# Patient Record
Sex: FEMALE | Race: WHITE | NOT HISPANIC OR LATINO | Employment: FULL TIME | URBAN - METROPOLITAN AREA
[De-identification: names, ages, dates, MRNs, and addresses within clinical notes are randomized per-mention and may not be internally consistent; named-entity substitution may affect disease eponyms.]

---

## 2018-04-05 ENCOUNTER — EMPLOYEE HEALTH (OUTPATIENT)
Dept: OCCUPATIONAL MEDICINE | Facility: CLINIC | Age: 48
End: 2018-04-05

## 2018-04-05 ENCOUNTER — EH NON-PROVIDER (OUTPATIENT)
Dept: OCCUPATIONAL MEDICINE | Facility: CLINIC | Age: 48
End: 2018-04-05

## 2018-04-05 ENCOUNTER — HOSPITAL ENCOUNTER (OUTPATIENT)
Facility: MEDICAL CENTER | Age: 48
End: 2018-04-05
Attending: PREVENTIVE MEDICINE
Payer: COMMERCIAL

## 2018-04-05 VITALS
WEIGHT: 117 LBS | TEMPERATURE: 98.5 F | BODY MASS INDEX: 18.36 KG/M2 | DIASTOLIC BLOOD PRESSURE: 90 MMHG | HEIGHT: 67 IN | OXYGEN SATURATION: 98 % | SYSTOLIC BLOOD PRESSURE: 116 MMHG

## 2018-04-05 DIAGNOSIS — Z02.1 PRE-EMPLOYMENT HEALTH SCREENING EXAMINATION: ICD-10-CM

## 2018-04-05 DIAGNOSIS — Z02.1 PRE-EMPLOYMENT DRUG SCREENING: ICD-10-CM

## 2018-04-05 LAB
AMP AMPHETAMINE: NORMAL
BAR BARBITURATES: NORMAL
BZO BENZODIAZEPINES: NORMAL
COC COCAINE: NORMAL
INT CON NEG: NORMAL
INT CON POS: NORMAL
MDMA ECSTASY: NORMAL
MET METHAMPHETAMINES: NORMAL
MTD METHADONE: NORMAL
OPI OPIATES: NORMAL
OXY OXYCODONE: NORMAL
PCP PHENCYCLIDINE: NORMAL
POC URINE DRUG SCREEN OCDRS: NORMAL
THC: NORMAL

## 2018-04-05 PROCEDURE — 94375 RESPIRATORY FLOW VOLUME LOOP: CPT | Performed by: PREVENTIVE MEDICINE

## 2018-04-05 PROCEDURE — 86480 TB TEST CELL IMMUN MEASURE: CPT | Performed by: PREVENTIVE MEDICINE

## 2018-04-05 PROCEDURE — 80305 DRUG TEST PRSMV DIR OPT OBS: CPT | Performed by: PREVENTIVE MEDICINE

## 2018-04-05 PROCEDURE — 8915 PR COMPREHENSIVE PHYSICAL: Performed by: PREVENTIVE MEDICINE

## 2018-04-06 LAB
M TB TUBERC IFN-G BLD QL: NEGATIVE
M TB TUBERC IFN-G/MITOGEN IGNF BLD: -0.46
M TB TUBERC IGNF/MITOGEN IGNF CONTROL: 47.94 [IU]/ML
MITOGEN IGNF BCKGRD COR BLD-ACNC: 1.54 [IU]/ML

## 2018-04-09 ENCOUNTER — DOCUMENTATION (OUTPATIENT)
Dept: OCCUPATIONAL MEDICINE | Facility: CLINIC | Age: 48
End: 2018-04-09

## 2018-09-04 ENCOUNTER — DOCUMENTATION (OUTPATIENT)
Dept: OCCUPATIONAL MEDICINE | Facility: CLINIC | Age: 48
End: 2018-09-04

## 2018-09-06 ENCOUNTER — OFFICE VISIT (OUTPATIENT)
Dept: MEDICAL GROUP | Facility: PHYSICIAN GROUP | Age: 48
End: 2018-09-06
Payer: COMMERCIAL

## 2018-09-06 VITALS
HEART RATE: 86 BPM | HEIGHT: 67 IN | TEMPERATURE: 98.6 F | WEIGHT: 121 LBS | BODY MASS INDEX: 18.99 KG/M2 | SYSTOLIC BLOOD PRESSURE: 112 MMHG | DIASTOLIC BLOOD PRESSURE: 78 MMHG | RESPIRATION RATE: 14 BRPM | OXYGEN SATURATION: 99 %

## 2018-09-06 DIAGNOSIS — A60.00 GENITAL HERPES SIMPLEX, UNSPECIFIED SITE: ICD-10-CM

## 2018-09-06 DIAGNOSIS — N60.02 BILATERAL BREAST CYSTS: ICD-10-CM

## 2018-09-06 DIAGNOSIS — N60.01 BILATERAL BREAST CYSTS: ICD-10-CM

## 2018-09-06 DIAGNOSIS — Z00.00 WELLNESS EXAMINATION: ICD-10-CM

## 2018-09-06 DIAGNOSIS — M54.2 NECK PAIN: ICD-10-CM

## 2018-09-06 PROCEDURE — 99203 OFFICE O/P NEW LOW 30 MIN: CPT | Performed by: INTERNAL MEDICINE

## 2018-09-06 RX ORDER — BACLOFEN 10 MG/1
10 TABLET ORAL 3 TIMES DAILY PRN
Qty: 90 TAB | Refills: 1 | Status: SHIPPED | OUTPATIENT
Start: 2018-09-06 | End: 2019-02-25 | Stop reason: SDUPTHER

## 2018-09-06 RX ORDER — VALACYCLOVIR HYDROCHLORIDE 500 MG/1
500 TABLET, FILM COATED ORAL 2 TIMES DAILY
Qty: 40 TAB | Refills: 0 | Status: SHIPPED | OUTPATIENT
Start: 2018-09-06 | End: 2019-05-02 | Stop reason: SDUPTHER

## 2018-09-06 ASSESSMENT — PATIENT HEALTH QUESTIONNAIRE - PHQ9: CLINICAL INTERPRETATION OF PHQ2 SCORE: 0

## 2018-09-06 NOTE — ASSESSMENT & PLAN NOTE
History of fluid filled bilateral breast cysts that come and go. Refuses to do mammograms and has aspirations by ultrasound. Has ultrasounds as needed when she's symptomatic from cyst enlargement.
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
## 2018-09-14 ENCOUNTER — TELEPHONE (OUTPATIENT)
Dept: MEDICAL GROUP | Facility: PHYSICIAN GROUP | Age: 48
End: 2018-09-14

## 2018-09-14 NOTE — TELEPHONE ENCOUNTER
A possible sig can be; Apply (4 g) of gel to the affected area 4 times daily.    Please Advise

## 2018-09-20 ENCOUNTER — EH NON-PROVIDER (OUTPATIENT)
Dept: OCCUPATIONAL MEDICINE | Facility: CLINIC | Age: 48
End: 2018-09-20

## 2018-09-20 DIAGNOSIS — Z02.89 ENCOUNTER FOR OCCUPATIONAL HEALTH EXAMINATION INVOLVING RESPIRATOR: Primary | ICD-10-CM

## 2018-09-20 PROCEDURE — 94375 RESPIRATORY FLOW VOLUME LOOP: CPT | Performed by: PREVENTIVE MEDICINE

## 2018-09-24 ENCOUNTER — IMMUNIZATION (OUTPATIENT)
Dept: OCCUPATIONAL MEDICINE | Facility: CLINIC | Age: 48
End: 2018-09-24

## 2018-09-24 DIAGNOSIS — Z23 NEED FOR VACCINATION: ICD-10-CM

## 2018-09-24 PROCEDURE — 90686 IIV4 VACC NO PRSV 0.5 ML IM: CPT | Performed by: PREVENTIVE MEDICINE

## 2018-09-27 ENCOUNTER — HOSPITAL ENCOUNTER (OUTPATIENT)
Dept: LAB | Facility: MEDICAL CENTER | Age: 48
End: 2018-09-27
Payer: COMMERCIAL

## 2018-09-27 LAB
BDY FAT % MEASURED: 18.3 %
BP DIAS: 65 MMHG
BP SYS: 122 MMHG
CHOLEST SERPL-MCNC: 172 MG/DL (ref 100–199)
DIABETES HTDIA: NO
EVENT NAME HTEVT: NORMAL
FASTING HTFAS: YES
GLUCOSE SERPL-MCNC: 81 MG/DL (ref 65–99)
HDLC SERPL-MCNC: 58 MG/DL
HYPERTENSION HTHYP: NO
LDLC SERPL CALC-MCNC: 97 MG/DL
SCREENING LOC CITY HTCIT: NORMAL
SCREENING LOC STATE HTSTA: NORMAL
SCREENING LOCATION HTLOC: NORMAL
SMOKING HTSMO: NO
SUBSCRIBER ID HTSID: NORMAL
TRIGL SERPL-MCNC: 85 MG/DL (ref 0–149)

## 2018-09-27 PROCEDURE — S5190 WELLNESS ASSESSMENT BY NONPH: HCPCS

## 2018-09-27 PROCEDURE — 82947 ASSAY GLUCOSE BLOOD QUANT: CPT

## 2018-09-27 PROCEDURE — 80061 LIPID PANEL: CPT

## 2018-09-27 PROCEDURE — 36415 COLL VENOUS BLD VENIPUNCTURE: CPT

## 2018-11-14 ENCOUNTER — HOSPITAL ENCOUNTER (OUTPATIENT)
Dept: RADIOLOGY | Facility: MEDICAL CENTER | Age: 48
End: 2018-11-14
Attending: PHYSICIAN ASSISTANT
Payer: COMMERCIAL

## 2018-11-14 DIAGNOSIS — M54.12 BRACHIAL NEURITIS: ICD-10-CM

## 2018-11-14 PROCEDURE — 72141 MRI NECK SPINE W/O DYE: CPT

## 2019-02-25 DIAGNOSIS — M54.2 NECK PAIN: ICD-10-CM

## 2019-02-26 NOTE — TELEPHONE ENCOUNTER
Was the patient seen in the last year in this department? Yes    Does patient have an active prescription for medications requested? No     Received Request Via: Pharmacy      Pt met protocol?: Yes   Pt last ov 9/18

## 2019-02-27 RX ORDER — BACLOFEN 10 MG/1
TABLET ORAL
Qty: 90 TAB | Refills: 1 | Status: SHIPPED | OUTPATIENT
Start: 2019-02-27 | End: 2019-06-04 | Stop reason: SDUPTHER

## 2019-05-02 DIAGNOSIS — A60.00 GENITAL HERPES SIMPLEX, UNSPECIFIED SITE: ICD-10-CM

## 2019-05-02 NOTE — TELEPHONE ENCOUNTER
Was the patient seen in the last year in this department? Yes    Does patient have an active prescription for medications requested? No     Received Request Via: Patient neil

## 2019-05-03 RX ORDER — VALACYCLOVIR HYDROCHLORIDE 500 MG/1
500 TABLET, FILM COATED ORAL 2 TIMES DAILY
Qty: 40 TAB | Refills: 0 | Status: SHIPPED | OUTPATIENT
Start: 2019-05-03 | End: 2019-06-19 | Stop reason: SDUPTHER

## 2019-06-04 DIAGNOSIS — M54.2 NECK PAIN: ICD-10-CM

## 2019-06-05 RX ORDER — BACLOFEN 10 MG/1
TABLET ORAL
Qty: 90 TAB | Refills: 0 | Status: SHIPPED | OUTPATIENT
Start: 2019-06-05 | End: 2019-07-18 | Stop reason: SDUPTHER

## 2019-06-05 NOTE — TELEPHONE ENCOUNTER
Was the patient seen in the last year in this department? Yes    Does patient have an active prescription for medications requested? No     Received Request Via: Pharmacy      Pt met protocol?: No    OV 9/18

## 2019-06-19 DIAGNOSIS — A60.00 GENITAL HERPES SIMPLEX, UNSPECIFIED SITE: ICD-10-CM

## 2019-06-19 RX ORDER — VALACYCLOVIR HYDROCHLORIDE 500 MG/1
500 TABLET, FILM COATED ORAL 2 TIMES DAILY
Qty: 40 TAB | Refills: 1 | Status: SHIPPED | OUTPATIENT
Start: 2019-06-19 | End: 2019-07-09

## 2019-06-19 NOTE — TELEPHONE ENCOUNTER
From: Ema Smith  Sent: 6/19/2019 6:25 AM PDT  Subject: Medication Renewal Request    Ema Smith would like a refill of the following medications:     valACYclovir (VALTREX) 500 MG Tab [Daren Casanova M.D.]    Preferred pharmacy: [id number] - [place], [place] - [address]    Comment:

## 2019-07-18 DIAGNOSIS — M54.2 NECK PAIN: ICD-10-CM

## 2019-07-19 RX ORDER — BACLOFEN 10 MG/1
10 TABLET ORAL 3 TIMES DAILY PRN
Qty: 90 TAB | Refills: 0 | Status: SHIPPED | OUTPATIENT
Start: 2019-07-19 | End: 2019-09-03 | Stop reason: SDUPTHER

## 2019-09-03 DIAGNOSIS — M54.2 NECK PAIN: ICD-10-CM

## 2019-09-04 RX ORDER — BACLOFEN 10 MG/1
10 TABLET ORAL 3 TIMES DAILY PRN
Qty: 90 TAB | Refills: 0 | Status: SHIPPED | OUTPATIENT
Start: 2019-09-04 | End: 2019-10-15 | Stop reason: SDUPTHER

## 2019-09-04 NOTE — TELEPHONE ENCOUNTER
Was the patient seen in the last year in this department? Yes    Does patient have an active prescription for medications requested? No     Received Request Via: Pharmacy      Pt met protocol?: Yes    LAST OV 09/06/2018

## 2019-09-12 ENCOUNTER — PATIENT MESSAGE (OUTPATIENT)
Dept: MEDICAL GROUP | Facility: PHYSICIAN GROUP | Age: 49
End: 2019-09-12

## 2019-09-26 ENCOUNTER — HOSPITAL ENCOUNTER (OUTPATIENT)
Dept: LAB | Facility: MEDICAL CENTER | Age: 49
End: 2019-09-26
Payer: COMMERCIAL

## 2019-09-26 LAB
BDY FAT % MEASURED: 17 %
BP DIAS: 65 MMHG
BP SYS: 118 MMHG
CHOLEST SERPL-MCNC: 146 MG/DL (ref 100–199)
DIABETES HTDIA: NO
EVENT NAME HTEVT: NORMAL
FASTING HTFAS: YES
GLUCOSE SERPL-MCNC: 81 MG/DL (ref 65–99)
HDLC SERPL-MCNC: 49 MG/DL
HYPERTENSION HTHYP: NO
LDLC SERPL CALC-MCNC: 68 MG/DL
SCREENING LOC CITY HTCIT: NORMAL
SCREENING LOC STATE HTSTA: NORMAL
SCREENING LOCATION HTLOC: NORMAL
SMOKING HTSMO: NO
SUBSCRIBER ID HTSID: NORMAL
TRIGL SERPL-MCNC: 144 MG/DL (ref 0–149)

## 2019-09-26 PROCEDURE — 36415 COLL VENOUS BLD VENIPUNCTURE: CPT

## 2019-09-26 PROCEDURE — 80061 LIPID PANEL: CPT

## 2019-09-26 PROCEDURE — 82947 ASSAY GLUCOSE BLOOD QUANT: CPT

## 2019-09-26 PROCEDURE — S5190 WELLNESS ASSESSMENT BY NONPH: HCPCS

## 2019-09-30 ENCOUNTER — DOCUMENTATION (OUTPATIENT)
Dept: OCCUPATIONAL MEDICINE | Facility: CLINIC | Age: 49
End: 2019-09-30

## 2019-09-30 ENCOUNTER — IMMUNIZATION (OUTPATIENT)
Dept: OCCUPATIONAL MEDICINE | Facility: CLINIC | Age: 49
End: 2019-09-30

## 2019-09-30 DIAGNOSIS — Z23 NEED FOR VACCINATION: ICD-10-CM

## 2019-09-30 PROCEDURE — 90686 IIV4 VACC NO PRSV 0.5 ML IM: CPT | Performed by: NURSE PRACTITIONER

## 2019-09-30 NOTE — TELEPHONE ENCOUNTER
----- Message from Ema Smith sent at 9/30/2019  4:13 AM PDT -----  Regarding: Non-Urgent Medical Question  Contact: [phone]  I noticed that my prescription for Valacyclovir 500 mg tablets are no longer on my medication list. I was wondering why as I need a refill?  Thank you.

## 2019-09-30 NOTE — TELEPHONE ENCOUNTER
Was the patient seen in the last year in this department? No     Does patient have an active prescription for medications requested? No     Received Request Via: Patient     Pt requesting refill of Valcyclovir.

## 2019-10-08 RX ORDER — VALACYCLOVIR HYDROCHLORIDE 500 MG/1
500 TABLET, FILM COATED ORAL 2 TIMES DAILY
Qty: 40 TAB | Refills: 0 | Status: ON HOLD | OUTPATIENT
Start: 2019-10-08 | End: 2019-12-23

## 2019-10-15 ENCOUNTER — EH NON-PROVIDER (OUTPATIENT)
Dept: OCCUPATIONAL MEDICINE | Facility: CLINIC | Age: 49
End: 2019-10-15

## 2019-10-15 DIAGNOSIS — Z02.89 ENCOUNTER FOR OCCUPATIONAL HEALTH EXAMINATION INVOLVING RESPIRATOR: Primary | ICD-10-CM

## 2019-10-15 DIAGNOSIS — M54.2 NECK PAIN: ICD-10-CM

## 2019-10-15 PROCEDURE — 94375 RESPIRATORY FLOW VOLUME LOOP: CPT | Performed by: PREVENTIVE MEDICINE

## 2019-10-17 RX ORDER — BACLOFEN 10 MG/1
10 TABLET ORAL 3 TIMES DAILY PRN
Qty: 90 TAB | Refills: 0 | Status: SHIPPED | OUTPATIENT
Start: 2019-10-17 | End: 2019-11-05

## 2019-11-05 ENCOUNTER — OFFICE VISIT (OUTPATIENT)
Dept: MEDICAL GROUP | Facility: MEDICAL CENTER | Age: 49
End: 2019-11-05
Payer: COMMERCIAL

## 2019-11-05 VITALS
HEIGHT: 67 IN | SYSTOLIC BLOOD PRESSURE: 100 MMHG | TEMPERATURE: 97.8 F | OXYGEN SATURATION: 96 % | BODY MASS INDEX: 17.42 KG/M2 | HEART RATE: 72 BPM | DIASTOLIC BLOOD PRESSURE: 74 MMHG | WEIGHT: 111 LBS

## 2019-11-05 DIAGNOSIS — Z12.4 SCREENING FOR CERVICAL CANCER: ICD-10-CM

## 2019-11-05 DIAGNOSIS — K21.00 GASTROESOPHAGEAL REFLUX DISEASE WITH ESOPHAGITIS: ICD-10-CM

## 2019-11-05 DIAGNOSIS — Z87.81 HISTORY OF CERVICAL FRACTURE: ICD-10-CM

## 2019-11-05 DIAGNOSIS — Z12.11 SCREENING FOR MALIGNANT NEOPLASM OF COLON: ICD-10-CM

## 2019-11-05 DIAGNOSIS — Z79.899 HIGH RISK MEDICATION USE: ICD-10-CM

## 2019-11-05 DIAGNOSIS — M54.2 CHRONIC NECK PAIN: ICD-10-CM

## 2019-11-05 DIAGNOSIS — G89.29 CHRONIC NECK PAIN: ICD-10-CM

## 2019-11-05 PROCEDURE — 99214 OFFICE O/P EST MOD 30 MIN: CPT | Performed by: NURSE PRACTITIONER

## 2019-11-05 RX ORDER — SUCRALFATE 1 G/1
1 TABLET ORAL
Qty: 120 TAB | Refills: 0 | Status: ON HOLD
Start: 2019-11-05 | End: 2019-12-23

## 2019-11-05 RX ORDER — OXYCODONE HYDROCHLORIDE 10 MG/1
10 TABLET ORAL EVERY 4 HOURS PRN
Qty: 28 TAB | Refills: 0
Start: 2019-11-05 | End: 2019-12-05

## 2019-11-05 RX ORDER — CYCLOBENZAPRINE HCL 10 MG
10 TABLET ORAL
Qty: 180 TAB | Refills: 1 | Status: SHIPPED | OUTPATIENT
Start: 2019-11-05 | End: 2019-11-07 | Stop reason: SDUPTHER

## 2019-11-05 SDOH — ECONOMIC STABILITY: FOOD INSECURITY: WITHIN THE PAST 12 MONTHS, YOU WORRIED THAT YOUR FOOD WOULD RUN OUT BEFORE YOU GOT MONEY TO BUY MORE.: NEVER TRUE

## 2019-11-05 SDOH — ECONOMIC STABILITY: TRANSPORTATION INSECURITY
IN THE PAST 12 MONTHS, HAS LACK OF TRANSPORTATION KEPT YOU FROM MEETINGS, WORK, OR FROM GETTING THINGS NEEDED FOR DAILY LIVING?: NO

## 2019-11-05 SDOH — HEALTH STABILITY: PHYSICAL HEALTH: ON AVERAGE, HOW MANY DAYS PER WEEK DO YOU ENGAGE IN MODERATE TO STRENUOUS EXERCISE (LIKE A BRISK WALK)?: 4 DAYS

## 2019-11-05 SDOH — HEALTH STABILITY: MENTAL HEALTH
STRESS IS WHEN SOMEONE FEELS TENSE, NERVOUS, ANXIOUS, OR CAN'T SLEEP AT NIGHT BECAUSE THEIR MIND IS TROUBLED. HOW STRESSED ARE YOU?: RATHER MUCH

## 2019-11-05 SDOH — HEALTH STABILITY: MENTAL HEALTH: HOW OFTEN DO YOU HAVE A DRINK CONTAINING ALCOHOL?: MONTHLY OR LESS

## 2019-11-05 SDOH — ECONOMIC STABILITY: INCOME INSECURITY: HOW HARD IS IT FOR YOU TO PAY FOR THE VERY BASICS LIKE FOOD, HOUSING, MEDICAL CARE, AND HEATING?: NOT HARD AT ALL

## 2019-11-05 SDOH — ECONOMIC STABILITY: FOOD INSECURITY: WITHIN THE PAST 12 MONTHS, THE FOOD YOU BOUGHT JUST DIDN'T LAST AND YOU DIDN'T HAVE MONEY TO GET MORE.: NEVER TRUE

## 2019-11-05 SDOH — HEALTH STABILITY: PHYSICAL HEALTH: ON AVERAGE, HOW MANY MINUTES DO YOU ENGAGE IN EXERCISE AT THIS LEVEL?: 120 MIN

## 2019-11-05 SDOH — ECONOMIC STABILITY: TRANSPORTATION INSECURITY
IN THE PAST 12 MONTHS, HAS THE LACK OF TRANSPORTATION KEPT YOU FROM MEDICAL APPOINTMENTS OR FROM GETTING MEDICATIONS?: NO

## 2019-11-05 ASSESSMENT — PATIENT HEALTH QUESTIONNAIRE - PHQ9: CLINICAL INTERPRETATION OF PHQ2 SCORE: 0

## 2019-11-05 NOTE — ASSESSMENT & PLAN NOTE
Followed by [facility].  On roxicodone with good control.  Uses nsaids for breakthrough pain and muscle relaxer
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
## 2019-11-07 DIAGNOSIS — G89.29 CHRONIC NECK PAIN: ICD-10-CM

## 2019-11-07 DIAGNOSIS — M54.2 CHRONIC NECK PAIN: ICD-10-CM

## 2019-11-07 RX ORDER — CYCLOBENZAPRINE HCL 10 MG
10 TABLET ORAL
Qty: 180 TAB | Refills: 1 | Status: SHIPPED | OUTPATIENT
Start: 2019-11-07 | End: 2020-01-01 | Stop reason: SDUPTHER

## 2019-12-22 ENCOUNTER — HOSPITAL ENCOUNTER (OUTPATIENT)
Facility: MEDICAL CENTER | Age: 49
End: 2019-12-23
Attending: EMERGENCY MEDICINE | Admitting: HOSPITALIST
Payer: COMMERCIAL

## 2019-12-22 ENCOUNTER — APPOINTMENT (OUTPATIENT)
Dept: RADIOLOGY | Facility: MEDICAL CENTER | Age: 49
End: 2019-12-22
Attending: EMERGENCY MEDICINE
Payer: COMMERCIAL

## 2019-12-22 DIAGNOSIS — R42 DIZZINESS: ICD-10-CM

## 2019-12-22 DIAGNOSIS — R00.0 SINUS TACHYCARDIA: ICD-10-CM

## 2019-12-22 LAB
ALBUMIN SERPL BCP-MCNC: 5 G/DL (ref 3.2–4.9)
ALBUMIN/GLOB SERPL: 1.6 G/DL
ALP SERPL-CCNC: 73 U/L (ref 30–99)
ALT SERPL-CCNC: 16 U/L (ref 2–50)
ANION GAP SERPL CALC-SCNC: 15 MMOL/L (ref 0–11.9)
AST SERPL-CCNC: 19 U/L (ref 12–45)
BASOPHILS # BLD AUTO: 0.2 % (ref 0–1.8)
BASOPHILS # BLD: 0.02 K/UL (ref 0–0.12)
BILIRUB SERPL-MCNC: 0.5 MG/DL (ref 0.1–1.5)
BUN SERPL-MCNC: 24 MG/DL (ref 8–22)
CALCIUM SERPL-MCNC: 10.4 MG/DL (ref 8.5–10.5)
CHLORIDE SERPL-SCNC: 104 MMOL/L (ref 96–112)
CO2 SERPL-SCNC: 22 MMOL/L (ref 20–33)
CREAT SERPL-MCNC: 0.95 MG/DL (ref 0.5–1.4)
D DIMER PPP IA.FEU-MCNC: <0.4 UG/ML (FEU) (ref 0–0.5)
EKG IMPRESSION: NORMAL
EKG IMPRESSION: NORMAL
EOSINOPHIL # BLD AUTO: 0.04 K/UL (ref 0–0.51)
EOSINOPHIL NFR BLD: 0.4 % (ref 0–6.9)
ERYTHROCYTE [DISTWIDTH] IN BLOOD BY AUTOMATED COUNT: 34.6 FL (ref 35.9–50)
GLOBULIN SER CALC-MCNC: 3.2 G/DL (ref 1.9–3.5)
GLUCOSE SERPL-MCNC: 109 MG/DL (ref 65–99)
HCG SERPL QL: NEGATIVE
HCT VFR BLD AUTO: 42 % (ref 37–47)
HGB BLD-MCNC: 15.1 G/DL (ref 12–16)
IMM GRANULOCYTES # BLD AUTO: 0.03 K/UL (ref 0–0.11)
IMM GRANULOCYTES NFR BLD AUTO: 0.3 % (ref 0–0.9)
LIPASE SERPL-CCNC: 26 U/L (ref 11–82)
LYMPHOCYTES # BLD AUTO: 2.65 K/UL (ref 1–4.8)
LYMPHOCYTES NFR BLD: 28.3 % (ref 22–41)
MAGNESIUM SERPL-MCNC: 2.1 MG/DL (ref 1.5–2.5)
MCH RBC QN AUTO: 30.3 PG (ref 27–33)
MCHC RBC AUTO-ENTMCNC: 36 G/DL (ref 33.6–35)
MCV RBC AUTO: 84.2 FL (ref 81.4–97.8)
MONOCYTES # BLD AUTO: 0.56 K/UL (ref 0–0.85)
MONOCYTES NFR BLD AUTO: 6 % (ref 0–13.4)
NEUTROPHILS # BLD AUTO: 6.05 K/UL (ref 2–7.15)
NEUTROPHILS NFR BLD: 64.8 % (ref 44–72)
NRBC # BLD AUTO: 0 K/UL
NRBC BLD-RTO: 0 /100 WBC
PLATELET # BLD AUTO: 257 K/UL (ref 164–446)
PMV BLD AUTO: 9.1 FL (ref 9–12.9)
POTASSIUM SERPL-SCNC: 4.2 MMOL/L (ref 3.6–5.5)
PROT SERPL-MCNC: 8.2 G/DL (ref 6–8.2)
RBC # BLD AUTO: 4.99 M/UL (ref 4.2–5.4)
SODIUM SERPL-SCNC: 141 MMOL/L (ref 135–145)
WBC # BLD AUTO: 9.4 K/UL (ref 4.8–10.8)

## 2019-12-22 PROCEDURE — 83735 ASSAY OF MAGNESIUM: CPT

## 2019-12-22 PROCEDURE — 93005 ELECTROCARDIOGRAM TRACING: CPT | Performed by: EMERGENCY MEDICINE

## 2019-12-22 PROCEDURE — 96374 THER/PROPH/DIAG INJ IV PUSH: CPT

## 2019-12-22 PROCEDURE — 86140 C-REACTIVE PROTEIN: CPT

## 2019-12-22 PROCEDURE — 700111 HCHG RX REV CODE 636 W/ 250 OVERRIDE (IP): Performed by: EMERGENCY MEDICINE

## 2019-12-22 PROCEDURE — 85379 FIBRIN DEGRADATION QUANT: CPT

## 2019-12-22 PROCEDURE — 99285 EMERGENCY DEPT VISIT HI MDM: CPT

## 2019-12-22 PROCEDURE — 94760 N-INVAS EAR/PLS OXIMETRY 1: CPT

## 2019-12-22 PROCEDURE — 83690 ASSAY OF LIPASE: CPT

## 2019-12-22 PROCEDURE — 71046 X-RAY EXAM CHEST 2 VIEWS: CPT

## 2019-12-22 PROCEDURE — 84484 ASSAY OF TROPONIN QUANT: CPT

## 2019-12-22 PROCEDURE — 84443 ASSAY THYROID STIM HORMONE: CPT

## 2019-12-22 PROCEDURE — 84703 CHORIONIC GONADOTROPIN ASSAY: CPT

## 2019-12-22 PROCEDURE — 85025 COMPLETE CBC W/AUTO DIFF WBC: CPT

## 2019-12-22 PROCEDURE — 700105 HCHG RX REV CODE 258: Performed by: EMERGENCY MEDICINE

## 2019-12-22 PROCEDURE — 80053 COMPREHEN METABOLIC PANEL: CPT

## 2019-12-22 PROCEDURE — 85652 RBC SED RATE AUTOMATED: CPT

## 2019-12-22 RX ORDER — LORAZEPAM 2 MG/ML
0.5 INJECTION INTRAMUSCULAR ONCE
Status: COMPLETED | OUTPATIENT
Start: 2019-12-22 | End: 2019-12-22

## 2019-12-22 RX ORDER — SODIUM CHLORIDE, SODIUM LACTATE, POTASSIUM CHLORIDE, CALCIUM CHLORIDE 600; 310; 30; 20 MG/100ML; MG/100ML; MG/100ML; MG/100ML
1000 INJECTION, SOLUTION INTRAVENOUS ONCE
Status: COMPLETED | OUTPATIENT
Start: 2019-12-22 | End: 2019-12-23

## 2019-12-22 RX ADMIN — SODIUM CHLORIDE, POTASSIUM CHLORIDE, SODIUM LACTATE AND CALCIUM CHLORIDE 1000 ML: 600; 310; 30; 20 INJECTION, SOLUTION INTRAVENOUS at 23:45

## 2019-12-22 RX ADMIN — LORAZEPAM 0.5 MG: 2 INJECTION INTRAMUSCULAR; INTRAVENOUS at 23:45

## 2019-12-23 ENCOUNTER — APPOINTMENT (OUTPATIENT)
Dept: CARDIOLOGY | Facility: MEDICAL CENTER | Age: 49
End: 2019-12-23
Attending: HOSPITALIST
Payer: COMMERCIAL

## 2019-12-23 VITALS
DIASTOLIC BLOOD PRESSURE: 66 MMHG | HEIGHT: 67 IN | BODY MASS INDEX: 16.89 KG/M2 | OXYGEN SATURATION: 100 % | TEMPERATURE: 97.3 F | HEART RATE: 92 BPM | WEIGHT: 107.58 LBS | SYSTOLIC BLOOD PRESSURE: 112 MMHG | RESPIRATION RATE: 16 BRPM

## 2019-12-23 PROBLEM — K21.9 GERD (GASTROESOPHAGEAL REFLUX DISEASE): Status: ACTIVE | Noted: 2019-12-23

## 2019-12-23 PROBLEM — R00.2 PALPITATIONS: Status: RESOLVED | Noted: 2019-12-23 | Resolved: 2019-12-23

## 2019-12-23 PROBLEM — R79.9 ELEVATED BUN: Status: RESOLVED | Noted: 2019-12-23 | Resolved: 2019-12-23

## 2019-12-23 PROBLEM — R79.9 ELEVATED BUN: Status: ACTIVE | Noted: 2019-12-23

## 2019-12-23 PROBLEM — R00.2 PALPITATIONS: Status: ACTIVE | Noted: 2019-12-23

## 2019-12-23 LAB
CRP SERPL HS-MCNC: 0.12 MG/DL (ref 0–0.75)
ERYTHROCYTE [SEDIMENTATION RATE] IN BLOOD BY WESTERGREN METHOD: 7 MM/HOUR (ref 0–20)
LV EJECT FRACT  99904: 65
LV EJECT FRACT MOD 2C 99903: 62.15
LV EJECT FRACT MOD 4C 99902: 58.8
LV EJECT FRACT MOD BP 99901: 59.99
TROPONIN T SERPL-MCNC: <6 NG/L (ref 6–19)
TSH SERPL DL<=0.005 MIU/L-ACNC: 1.08 UIU/ML (ref 0.38–5.33)

## 2019-12-23 PROCEDURE — 700105 HCHG RX REV CODE 258: Performed by: HOSPITALIST

## 2019-12-23 PROCEDURE — 93306 TTE W/DOPPLER COMPLETE: CPT | Mod: 26 | Performed by: INTERNAL MEDICINE

## 2019-12-23 PROCEDURE — 93306 TTE W/DOPPLER COMPLETE: CPT

## 2019-12-23 PROCEDURE — G0378 HOSPITAL OBSERVATION PER HR: HCPCS

## 2019-12-23 PROCEDURE — 93005 ELECTROCARDIOGRAM TRACING: CPT | Performed by: HOSPITALIST

## 2019-12-23 PROCEDURE — 36415 COLL VENOUS BLD VENIPUNCTURE: CPT

## 2019-12-23 PROCEDURE — 99236 HOSP IP/OBS SAME DATE HI 85: CPT | Performed by: HOSPITALIST

## 2019-12-23 PROCEDURE — 700102 HCHG RX REV CODE 250 W/ 637 OVERRIDE(OP): Performed by: HOSPITALIST

## 2019-12-23 PROCEDURE — A9270 NON-COVERED ITEM OR SERVICE: HCPCS | Performed by: HOSPITALIST

## 2019-12-23 PROCEDURE — 84484 ASSAY OF TROPONIN QUANT: CPT

## 2019-12-23 RX ORDER — LORAZEPAM 1 MG/1
0.5 TABLET ORAL EVERY 4 HOURS PRN
Status: DISCONTINUED | OUTPATIENT
Start: 2019-12-23 | End: 2019-12-23 | Stop reason: HOSPADM

## 2019-12-23 RX ORDER — AMOXICILLIN 250 MG
2 CAPSULE ORAL 2 TIMES DAILY
Status: DISCONTINUED | OUTPATIENT
Start: 2019-12-23 | End: 2019-12-23 | Stop reason: HOSPADM

## 2019-12-23 RX ORDER — SODIUM CHLORIDE 9 MG/ML
INJECTION, SOLUTION INTRAVENOUS CONTINUOUS
Status: DISCONTINUED | OUTPATIENT
Start: 2019-12-23 | End: 2019-12-23 | Stop reason: HOSPADM

## 2019-12-23 RX ORDER — ASPIRIN 81 MG/1
324 TABLET, CHEWABLE ORAL DAILY
Status: DISCONTINUED | OUTPATIENT
Start: 2019-12-23 | End: 2019-12-23

## 2019-12-23 RX ORDER — ASPIRIN 325 MG
325 TABLET ORAL DAILY
Status: DISCONTINUED | OUTPATIENT
Start: 2019-12-23 | End: 2019-12-23

## 2019-12-23 RX ORDER — PROMETHAZINE HYDROCHLORIDE 25 MG/1
12.5-25 TABLET ORAL EVERY 4 HOURS PRN
Status: DISCONTINUED | OUTPATIENT
Start: 2019-12-23 | End: 2019-12-23 | Stop reason: HOSPADM

## 2019-12-23 RX ORDER — PROCHLORPERAZINE EDISYLATE 5 MG/ML
5-10 INJECTION INTRAMUSCULAR; INTRAVENOUS EVERY 4 HOURS PRN
Status: DISCONTINUED | OUTPATIENT
Start: 2019-12-23 | End: 2019-12-23 | Stop reason: HOSPADM

## 2019-12-23 RX ORDER — BISACODYL 10 MG
10 SUPPOSITORY, RECTAL RECTAL
Status: DISCONTINUED | OUTPATIENT
Start: 2019-12-23 | End: 2019-12-23 | Stop reason: HOSPADM

## 2019-12-23 RX ORDER — PROMETHAZINE HYDROCHLORIDE 12.5 MG/1
12.5-25 SUPPOSITORY RECTAL EVERY 4 HOURS PRN
Status: DISCONTINUED | OUTPATIENT
Start: 2019-12-23 | End: 2019-12-23 | Stop reason: HOSPADM

## 2019-12-23 RX ORDER — OXYCODONE HYDROCHLORIDE 10 MG/1
10 TABLET ORAL 3 TIMES DAILY
Status: ON HOLD | COMMUNITY
End: 2019-12-23

## 2019-12-23 RX ORDER — ASPIRIN 300 MG/1
300 SUPPOSITORY RECTAL DAILY
Status: DISCONTINUED | OUTPATIENT
Start: 2019-12-23 | End: 2019-12-23

## 2019-12-23 RX ORDER — OMEPRAZOLE 20 MG/1
20 CAPSULE, DELAYED RELEASE ORAL DAILY
Qty: 30 CAP | COMMUNITY
Start: 2019-12-24 | End: 2019-12-23

## 2019-12-23 RX ORDER — SUCRALFATE 1 G/1
1 TABLET ORAL
Status: DISCONTINUED | OUTPATIENT
Start: 2019-12-23 | End: 2019-12-23 | Stop reason: HOSPADM

## 2019-12-23 RX ORDER — OMEPRAZOLE 20 MG/1
20 CAPSULE, DELAYED RELEASE ORAL DAILY
Status: DISCONTINUED | OUTPATIENT
Start: 2019-12-23 | End: 2019-12-23 | Stop reason: HOSPADM

## 2019-12-23 RX ORDER — HEPARIN SODIUM 5000 [USP'U]/ML
5000 INJECTION, SOLUTION INTRAVENOUS; SUBCUTANEOUS EVERY 8 HOURS
Status: DISCONTINUED | OUTPATIENT
Start: 2019-12-23 | End: 2019-12-23 | Stop reason: HOSPADM

## 2019-12-23 RX ORDER — ONDANSETRON 2 MG/ML
4 INJECTION INTRAMUSCULAR; INTRAVENOUS EVERY 4 HOURS PRN
Status: DISCONTINUED | OUTPATIENT
Start: 2019-12-23 | End: 2019-12-23 | Stop reason: HOSPADM

## 2019-12-23 RX ORDER — ONDANSETRON 4 MG/1
4 TABLET, ORALLY DISINTEGRATING ORAL EVERY 4 HOURS PRN
Status: DISCONTINUED | OUTPATIENT
Start: 2019-12-23 | End: 2019-12-23 | Stop reason: HOSPADM

## 2019-12-23 RX ORDER — POLYETHYLENE GLYCOL 3350 17 G/17G
1 POWDER, FOR SOLUTION ORAL
Status: DISCONTINUED | OUTPATIENT
Start: 2019-12-23 | End: 2019-12-23 | Stop reason: HOSPADM

## 2019-12-23 RX ADMIN — LORAZEPAM 0.5 MG: 1 TABLET ORAL at 07:00

## 2019-12-23 RX ADMIN — SUCRALFATE 1 G: 1 TABLET ORAL at 06:48

## 2019-12-23 RX ADMIN — SODIUM CHLORIDE: 9 INJECTION, SOLUTION INTRAVENOUS at 09:51

## 2019-12-23 RX ADMIN — SODIUM CHLORIDE: 9 INJECTION, SOLUTION INTRAVENOUS at 02:45

## 2019-12-23 RX ADMIN — OMEPRAZOLE 20 MG: 20 CAPSULE, DELAYED RELEASE ORAL at 06:48

## 2019-12-23 ASSESSMENT — LIFESTYLE VARIABLES
TOTAL SCORE: 0
TOTAL SCORE: 0
AVERAGE NUMBER OF DAYS PER WEEK YOU HAVE A DRINK CONTAINING ALCOHOL: 0
HAVE PEOPLE ANNOYED YOU BY CRITICIZING YOUR DRINKING: NO
ALCOHOL_USE: NO
ON A TYPICAL DAY WHEN YOU DRINK ALCOHOL HOW MANY DRINKS DO YOU HAVE: 0
SUBSTANCE_ABUSE: 0
EVER FELT BAD OR GUILTY ABOUT YOUR DRINKING: NO
HAVE YOU EVER FELT YOU SHOULD CUT DOWN ON YOUR DRINKING: NO
TOTAL SCORE: 0
CONSUMPTION TOTAL: NEGATIVE
DOES PATIENT WANT TO STOP DRINKING: NO
EVER_SMOKED: NEVER
EVER HAD A DRINK FIRST THING IN THE MORNING TO STEADY YOUR NERVES TO GET RID OF A HANGOVER: NO
HOW MANY TIMES IN THE PAST YEAR HAVE YOU HAD 5 OR MORE DRINKS IN A DAY: 0

## 2019-12-23 ASSESSMENT — ENCOUNTER SYMPTOMS
FOCAL WEAKNESS: 0
DIZZINESS: 1
COUGH: 0
CHILLS: 0
DOUBLE VISION: 0
SPEECH CHANGE: 0
FLANK PAIN: 0
HEARTBURN: 0
WEAKNESS: 0
BLURRED VISION: 0
DIARRHEA: 1
EYE DISCHARGE: 0
HALLUCINATIONS: 0
SENSORY CHANGE: 0
VOMITING: 1
DEPRESSION: 0
FEVER: 0
PALPITATIONS: 1
SHORTNESS OF BREATH: 0
ABDOMINAL PAIN: 1
NAUSEA: 1
BRUISES/BLEEDS EASILY: 0
MYALGIAS: 0
HEMOPTYSIS: 0

## 2019-12-23 ASSESSMENT — PATIENT HEALTH QUESTIONNAIRE - PHQ9
1. LITTLE INTEREST OR PLEASURE IN DOING THINGS: NOT AT ALL
SUM OF ALL RESPONSES TO PHQ9 QUESTIONS 1 AND 2: 0
2. FEELING DOWN, DEPRESSED, IRRITABLE, OR HOPELESS: NOT AT ALL

## 2019-12-23 ASSESSMENT — PAIN SCALES - WONG BAKER: WONGBAKER_NUMERICALRESPONSE: DOESN'T HURT AT ALL

## 2019-12-23 NOTE — ED PROVIDER NOTES
ED Provider Note    Scribed for James Snyder D.O. by Rogerio Umana. 12/22/2019  11:21 PM    Primary care provider: VALERIY Pat   History obtained from: Patient   History limited by: None       CHIEF COMPLAINT  Chief Complaint   Patient presents with   • Tachycardia   • Dizziness        HPI    Ema Smith is a 49 y.o. female who presents to the ED for evaluation of tachycardia and dizziness. She states that she woke up around 2 PM this afternoon and noticed that her heart rate was elevated. She checked her heart rate on her pulse ox which was 139, and then later checked it again before going to work and noticed it was 144. Her heart rate has remained elevated since then, and she has become increasingly lightheaded, however has not lost consciousness. She denies any chest pain, nausea, vomiting, diarrhea, shortness of breath or leg swelling.    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  Patient denies any recent illness and reports that she was fine yesterday before going to sleep.  She woke up with her symptoms this afternoon and that has been persistent since.  She denies any pain/fever.  She denies possibility of pregnancy.  She denies any known history of heart problems or past similar symptoms.    REVIEW OF SYSTEMS  Please see HPI for pertinent positives/negatives.  All other systems reviewed and are negative.     PAST MEDICAL HISTORY  Past Medical History:   Diagnosis Date   • Genital herpes 9/6/2018   • Neck pain 9/6/2018   • Bilateral breast cysts 9/6/2018   • Encounter for long-term (current) use of other medications         SURGICAL HISTORY  Past Surgical History:   Procedure Laterality Date   • OTHER      uterine septation         SOCIAL HISTORY  Social History     Tobacco Use   • Smoking status: Never Smoker   • Smokeless tobacco: Never Used   Substance and Sexual Activity   • Alcohol use: Yes     Alcohol/week: 1.2 oz     Types: 2 Shots of liquor per week     Frequency: Monthly or less      "Comment: ocasionally    • Drug use: No   • Sexual activity: Not on file        FAMILY HISTORY  Family History   Problem Relation Age of Onset   • Alcohol abuse Maternal Grandfather    • Cancer Paternal Grandmother         lung in smoker        CURRENT MEDICATIONS  No current facility-administered medications on file prior to encounter.      Current Outpatient Medications on File Prior to Encounter   Medication Sig Dispense Refill   • oxyCODONE HCl (ROXICODONE PO) Take 10 mg by mouth 3 times a day as needed.     • cyclobenzaprine (FLEXERIL) 10 MG Tab Take 1 Tab by mouth every bedtime. 180 Tab 1   • sucralfate (CARAFATE) 1 GM Tab Take 1 Tab by mouth 4 Times a Day,Before Meals and at Bedtime. 120 Tab 0   • valACYclovir (VALTREX) 500 MG Tab Take 1 Tab by mouth 2 times a day. 40 Tab 0   • EPINEPHrine 0.3 MG/0.3ML Solution Prefilled Syringe 1 Application by Injection route 1 time daily as needed. 1 Each 3   • Diclofenac Sodium (VOLTAREN) 1 % Gel Apply 1 Application to skin as directed 4 times a day as needed. 1 Tube 3          ALLERGIES  Allergies   Allergen Reactions   • Pcn [Penicillins] Anaphylaxis        PHYSICAL EXAM  VITAL SIGNS: /90   Pulse (!) 128   Temp 36.5 °C (97.7 °F) (Oral)   Resp (!) 22   Ht 1.702 m (5' 7\")   Wt 49 kg (108 lb)   SpO2 96%   BMI 16.92 kg/m²  @ALEJO[[id number]::@     Pulse ox interpretation: 96% I interpret this pulse ox as normal     Cardiac monitor interpretation: Sinus tachycardia with heart rate in the 130s as interpreted by me.  The patient presented with tachycardia and cardiac monitor was ordered to monitor for dysrhythmia.    Constitutional: Well developed, well nourished, alert, nontoxic appearance    HENT: No external signs of trauma, normocephalic, oropharynx moist and clear, nose normal    Eyes: PERRL, conjunctiva without erythema, no discharge, no icterus    Neck: Soft and supple, trachea midline, no stridor, no tenderness, no LAD, no JVD, good ROM    Cardiovascular: " Tachycardia, no murmurs/rubs/gallops, strong distal pulses and good perfusion    Thorax & Lungs: Tachypneic, CTAB   Abdomen: Soft, nontender, nondistended, no guarding, no rebound, normal BS    Extremities: No cyanosis, no edema, no gross deformity, good ROM, intact distal pulses with brisk cap refill    Skin: Warm, dry, no pallor/cyanosis, no rash noted    Lymphatic: No lymphadenopathy noted    Neuro: A/O times 3, no focal deficits noted    Psychiatric: Cooperative, anxious patient, normal judgement      DIAGNOSTIC STUDIES / PROCEDURES    EKG  12 Lead EKG obtained at 2308 and interpreted by me:   Rate: 132   Rhythm: Sinus tachycardia  Ectopy: PVCs  Intervals: Normal   Axis: Normal   QRS: Late precordial R wave transition  ST segments: Normal  T Waves: Normal    Clinical Impression: Sinus tachycardia with baseline artifact, multiple PVCs, no acute ischemic changes or other dysrhythmia       LABS  All labs reviewed by me.     Results for orders placed or performed during the hospital encounter of 12/22/19   CBC WITH DIFFERENTIAL   Result Value Ref Range    WBC 9.4 4.8 - 10.8 K/uL    RBC 4.99 4.20 - 5.40 M/uL    Hemoglobin 15.1 12.0 - 16.0 g/dL    Hematocrit 42.0 37.0 - 47.0 %    MCV 84.2 81.4 - 97.8 fL    MCH 30.3 27.0 - 33.0 pg    MCHC 36.0 (H) 33.6 - 35.0 g/dL    RDW 34.6 (L) 35.9 - 50.0 fL    Platelet Count 257 164 - 446 K/uL    MPV 9.1 9.0 - 12.9 fL    Neutrophils-Polys 64.80 44.00 - 72.00 %    Lymphocytes 28.30 22.00 - 41.00 %    Monocytes 6.00 0.00 - 13.40 %    Eosinophils 0.40 0.00 - 6.90 %    Basophils 0.20 0.00 - 1.80 %    Immature Granulocytes 0.30 0.00 - 0.90 %    Nucleated RBC 0.00 /100 WBC    Neutrophils (Absolute) 6.05 2.00 - 7.15 K/uL    Lymphs (Absolute) 2.65 1.00 - 4.80 K/uL    Monos (Absolute) 0.56 0.00 - 0.85 K/uL    Eos (Absolute) 0.04 0.00 - 0.51 K/uL    Baso (Absolute) 0.02 0.00 - 0.12 K/uL    Immature Granulocytes (abs) 0.03 0.00 - 0.11 K/uL    NRBC (Absolute) 0.00 K/uL   COMP METABOLIC PANEL    Result Value Ref Range    Sodium 141 135 - 145 mmol/L    Potassium 4.2 3.6 - 5.5 mmol/L    Chloride 104 96 - 112 mmol/L    Co2 22 20 - 33 mmol/L    Anion Gap 15.0 (H) 0.0 - 11.9    Glucose 109 (H) 65 - 99 mg/dL    Bun 24 (H) 8 - 22 mg/dL    Creatinine 0.95 0.50 - 1.40 mg/dL    Calcium 10.4 8.5 - 10.5 mg/dL    AST(SGOT) 19 12 - 45 U/L    ALT(SGPT) 16 2 - 50 U/L    Alkaline Phosphatase 73 30 - 99 U/L    Total Bilirubin 0.5 0.1 - 1.5 mg/dL    Albumin 5.0 (H) 3.2 - 4.9 g/dL    Total Protein 8.2 6.0 - 8.2 g/dL    Globulin 3.2 1.9 - 3.5 g/dL    A-G Ratio 1.6 g/dL   TROPONIN   Result Value Ref Range    Troponin T <6 6 - 19 ng/L   LIPASE   Result Value Ref Range    Lipase 26 11 - 82 U/L   D-DIMER   Result Value Ref Range    D-Dimer Screen <0.40 0.00 - 0.50 ug/mL (FEU)   MAGNESIUM   Result Value Ref Range    Magnesium 2.1 1.5 - 2.5 mg/dL   TSH   Result Value Ref Range    TSH 1.080 0.380 - 5.330 uIU/mL   BETA-HCG QUALITATIVE SERUM   Result Value Ref Range    Beta-Hcg Qualitative Serum Negative Negative   ESTIMATED GFR   Result Value Ref Range    GFR If African American >60 >60 mL/min/1.73 m 2    GFR If Non African American >60 >60 mL/min/1.73 m 2   WESTERGREN SED RATE   Result Value Ref Range    Sed Rate Westergren 7 0 - 20 mm/hour   CRP QUANTITIVE (NON-CARDIAC)   Result Value Ref Range    Stat C-Reactive Protein 0.12 0.00 - 0.75 mg/dL   EKG (NOW)   Result Value Ref Range    Report       [facility]    Test Date:  2019  Pt Name:    JENNIFER VOGEL                  Department: ER  MRN:        [id number]                      Room:       [facility]  Gender:     Female                       Technician: EDSFHR  :        1970                   Requested By:ER TRIAGE PROTOCOL  Order #:    [id number]                    Reading MD:    Measurements  Intervals                                Axis  Rate:       132                          P:  MA:                                      QRS:         61  QRSD:       96                           T:          58  QT:         316  QTc:        468    Interpretive Statements  ATRIAL FIBRILLATION  RUN OF VENTRICULAR PREMATURE COMPLEXES  LATERAL INFARCT, OLD  ARTIFACT IN LEAD(S) I,II,aVR,aVL,V1,V2,V3,V4,V5,V6 AND BASELINE WANDER IN  LEAD(S) II,III,aVR,aVF,V1,V2,V3,V4,V5,V6  No previous ECG available for comparison          RADIOLOGY  The radiologist's interpretation of all radiological studies have been reviewed by me.     DX-CHEST-2 VIEWS   Final Result         1.  No acute cardiopulmonary disease.   2.  Symmetric nodular densities overlying the bilateral lung bases, appearance most compatible with nipple shadows, visible on lateral view.      EC-ECHOCARDIOGRAM COMPLETE W/O CONT    (Results Pending)          COURSE & MEDICAL DECISION MAKING  Nursing notes, VS, PMSFHx reviewed in chart.     Review of past medical records shows the patient without prior visits to this ED.      Differential diagnoses considered include but are not limited to: Atrial fib/flutter, SVT, ventricular tachycardia, WPW, Brugada sydrome, prolonged QT syndrome, PAC, PVC, AMI, CHF, valvular heart disease, thyroid disorder, electrolyte abnormality, anxiety      11:24 PM Patient presents to the ED with tachycardia and dizziness. Patient will be treated with LR bolus and Ativan 0.5 mg. Ordered for DX-Chest 2 views, Magnesium, TSH, Beta-HCG Qual, CBC with differential, CMP, Troponin, Lipase, D-Dimer, Estimated GFR, EKG to evaluate her symptoms. Discussed with the patient that her EKG is overall reassuring in sinus rhythm, however does show a few PVC's. I will plan to treat her with ativan to try and calm her down, as well as check her blood work for any underlying causes or concerns relating to her symptoms.      0105: D/W Dr. Mae, hospitalist, who will admit patient      History and physical exam as above.  EKG showed sinus tachycardia with multiple PVCs without acute ischemic changes or other  dysrhythmia.  Chest x-ray with findings as above.  Labs are fairly unremarkable.  Patient was given Ativan for possible anxiety as well as IV fluid for her tachycardia.  I discussed the findings with the patient and her significant other.  She is now much calmer and her heart rate and tachypnea have both improved.  I performed a limited bedside cardiac ultrasound with possible pericardial effusion noted.  Patient without any clinical signs of hemodynamic collapse to suggest tamponade.  I discussed the findings with the patient and she is agreeable to admission.  Discussed with Dr. Mae who graciously agreed to admit patient for further care.      HYDRATION: Based on the patient's presentation of Tachycardia the patient was given IV fluids. IV Hydration was used because oral hydration was not as rapid as required. Upon recheck following hydration, the patient was improved.        FINAL IMPRESSION  1. Sinus tachycardia Acute   2. Dizziness Acute          DISPOSITION  Patient will be admitted by hospitalist for further care       Rogerio MCGEE (Meree), am scribing for, and in the presence of, James Snyder D.O..    Electronically signed by: Rogerio Umana (Aki), 12/22/2019    IJames D.O. personally performed the services described in this documentation, as scribed by Rogerio Umana in my presence, and it is both accurate and complete. C.      Portions of this record were made with voice recognition software and by scribes.  Despite my review, spelling/grammar/context errors may still remain.  Interpretation of this chart should be taken in this context.

## 2019-12-23 NOTE — DISCHARGE INSTRUCTIONS
Discharge Instructions per CECE Carrion.P.RAlfonzoN.    Please follow up with Jewels HERNANDEZ, attempt to de-stress as much as possible, stay well hydrated. Discuss cardiology referral for possible extended heart rate monitoring.   DIET: As tolerated, avoid energy drinks   ACTIVITY: As tolerated   DIAGNOSIS: Tachycardia  Return to ER if increased heart rate, increased chest pain, or syncope             Sinus Tachycardia  Sinus tachycardia is a kind of fast heartbeat. In sinus tachycardia, the heart beats more than 100 times a minute. Sinus tachycardia starts in a part of the heart called the sinus node. Sinus tachycardia may be harmless, or it may be a sign of a serious condition.  What are the causes?  This condition may be caused by:  · Exercise or exertion.  · A fever.  · Pain.  · Loss of body fluids (dehydration).  · Severe bleeding (hemorrhage).  · Anxiety and stress.  · Certain substances, including:  ¨ Alcohol.  ¨ Caffeine.  ¨ Tobacco and nicotine products.  ¨ Diet pills.  ¨ Illegal drugs.  · Medical conditions including:  ¨ Heart disease.  ¨ An infection.  ¨ An overactive thyroid (hyperthyroidism).  ¨ A lack of red blood cells (anemia).  What are the signs or symptoms?  Symptoms of this condition include:  · A feeling that the heart is beating quickly (palpitations).  · Suddenly noticing your heartbeat (cardiac awareness).  · Dizziness.  · Tiredness (fatigue).  · Shortness of breath.  · Chest pain.  · Nausea.  · Fainting.  How is this diagnosed?  This condition is diagnosed with:  · A physical exam.  · Other tests, such as:  ¨ Blood tests.  ¨ An electrocardiogram (ECG). This test measures the electrical activity of the heart.  ¨ Holter monitoring. For this test, you wear a device that records your heartbeat for one or more days.  You may be referred to a heart specialist (cardiologist).  How is this treated?  Treatment for this condition depends on the cause or underlying condition. Treatment may  involve:  · Treating the underlying condition.  · Taking new medicines or changing your current medicines as told by your health care provider.  · Making changes to your diet or lifestyle.  · Practicing relaxation methods.  Follow these instructions at home:  Lifestyle  · Do not use any products that contain nicotine or tobacco, such as cigarettes and e-cigarettes. If you need help quitting, ask your health care provider.  · Learn relaxation methods, like deep breathing, to help you when you get stressed or anxious.  · Do not use illegal drugs, such as cocaine.  · Do not abuse alcohol. Limit alcohol intake to no more than 1 drink a day for non-pregnant women and 2 drinks a day for men. One drink equals 12 oz of beer, 5 oz of wine, or 1½ oz of hard liquor.  · Find time to rest and relax often. This reduces stress.  · Avoid:  ¨ Caffeine.  ¨ Stimulants such as over-the-counter diet pills or pills that help you to stay awake.  ¨ Situations that cause anxiety or stress.  General instructions  · Drink enough fluids to keep your urine clear or pale yellow.  · Take over-the-counter and prescription medicines only as told by your health care provider.  · Keep all follow-up visits as told by your health care provider. This is important.  Contact a health care provider if:  · You have a fever.  · You have vomiting or diarrhea that keeps happening (is persistent).  Get help right away if:  · You have pain in your chest, upper arms, jaw, or neck.  · You become weak or dizzy.  · You feel faint.  · You have palpitations that do not go away.  This information is not intended to replace advice given to you by your health care provider. Make sure you discuss any questions you have with your health care provider.  Document Released: 01/25/2006 Document Revised: 07/15/2017 Document Reviewed: 07/01/2016  [employer] Patient Education © 2017 [employer]      Discharge Instructions    Discharged to home by car with relative.  Discharged via wheelchair, hospital escort: Yes.  Special equipment needed: Not Applicable    Be sure to schedule a follow-up appointment with your primary care doctor or any specialists as instructed.     Discharge Plan:   Diet Plan: Discussed  Activity Level: Discussed  Confirmed Follow up Appointment: Patient to Call and Schedule Appointment  Confirmed Symptoms Management: Discussed  Medication Reconciliation Updated: Yes  Influenza Vaccine Indication: Not indicated: Previously immunized this influenza season and > 8 years of age    I understand that a diet low in cholesterol, fat, and sodium is recommended for good health. Unless I have been given specific instructions below for another diet, I accept this instruction as my diet prescription.   Other diet: Heart healthy     Special Instructions: None    · Is patient discharged on Warfarin / Coumadin?   No     Depression / Suicide Risk    [facility], it is important to learn how to keep safe from harming yourself.    Recognize the warning signs:  · Abrupt changes in personality, positive or negative- including increase in energy   · Giving away possessions  · Change in eating patterns- significant weight changes-  positive or negative  · Change in sleeping patterns- unable to sleep or sleeping all the time   · Unwillingness or inability to communicate  · Depression  · Unusual sadness, discouragement and loneliness  · Talk of wanting to die  · Neglect of personal appearance   · Rebelliousness- reckless behavior  · Withdrawal from people/activities they love  · Confusion- inability to concentrate     If you or a loved one observes any of these behaviors or has concerns about self-harm, here's what you can do:  · Talk about it- your feelings and reasons for harming yourself  · Remove any means that you might use to hurt yourself (examples: pills, rope, extension cords, firearm)  · Get professional help from the community  (Mental Health, Substance Abuse, psychological counseling)  · Do not be alone:Call your Safe Contact- someone whom you trust who will be there for you.  · Call your local CRISIS HOTLINE 102-1062 or 345-926-1291  · Call your local Children's Mobile Crisis Response Team [place] (807) 548-6573 or www.[employer]  · Call the toll free National Suicide Prevention Hotlines   · National Suicide Prevention Lifeline 877-643-NCAP (7657)  · National Hope Line Network 800-SUICIDE (629-3187)

## 2019-12-23 NOTE — PROGRESS NOTES
Assessment completed.  Pt A&Ox4.  Respirations even, unlabored on room air. Pt denies any pain at this time.  Pt denies any CP, SOB, nausea, palpitations at this time.  Sinus rhythm with frequent PVCs on telemetry monitor.  POC discussed: awaiting echocardiogram results ; communication board updated.    Bed in locked, lowest position.  Call light and belongings within reach.  Needs met, will continue to monitor.

## 2019-12-23 NOTE — ASSESSMENT & PLAN NOTE
She has sinus tachycardia, with intermittent chest pain, anxiety, impending doom   She has had intermittent vomiting/diarrhea this week which has improved  Suspect probably anxiety and hypovolemia   Bed side ultrasound concern from ERP for pericardial effusion, will check formal echo   Cardiac monitoring   Serial troponin   Prn ativan

## 2019-12-23 NOTE — ASSESSMENT & PLAN NOTE
This seems more consistent with gastric ulceration based on her symptoms   At this time she refuses endoscopy   Con with PPI and carafate

## 2019-12-23 NOTE — H&P
Hospital Medicine History & Physical Note    Date of Service  12/23/2019    Primary Care Physician  VALERIY Pat    Consultants  none    Code Status  full    Chief Complaint  Dizziness, tachycardia     History of Presenting Illness  49 y.o. female who presented 12/22/2019 with past medical history of acid reflux who presents with tachycardia and dizziness.  This patient woke up around 2 PM this afternoon she noticed that her heart rate was elevated on her home monitor which is around 139.  She checked it before going to work it was again 144.  She works as a nurse here at [employer].  She was on her floor and started becoming increasingly lightheadedness with no loss of consciousness.  She has developed some palpitations.  She has noticed some intermittent chest pain over the last week.  She has no known alleviating or exacerbating factors to her symptoms.  She has had some intermittent vomiting and diarrhea over the last week especially with eating and she is being worked up as an outpatient for acid reflux versus gastric ulcers.  In the emergency department she is found to be in sinus tachycardia will be admitted to the hospital for further observation and management of her symptoms.    Review of Systems  Review of Systems   Constitutional: Positive for malaise/fatigue. Negative for chills and fever.   HENT: Negative for congestion, hearing loss and tinnitus.    Eyes: Negative for blurred vision, double vision and discharge.   Respiratory: Negative for cough, hemoptysis and shortness of breath.    Cardiovascular: Positive for chest pain and palpitations. Negative for leg swelling.   Gastrointestinal: Positive for abdominal pain, diarrhea, nausea and vomiting. Negative for heartburn.   Genitourinary: Negative for dysuria and flank pain.   Musculoskeletal: Negative for joint pain and myalgias.   Skin: Negative for rash.   Neurological: Positive for dizziness. Negative for sensory change, speech change, focal  weakness and weakness.   Endo/Heme/Allergies: Negative for environmental allergies. Does not bruise/bleed easily.   Psychiatric/Behavioral: Negative for depression, hallucinations and substance abuse.       Past Medical History   has a past medical history of Bilateral breast cysts (2018), Encounter for long-term (current) use of other medications, Genital herpes (2018), and Neck pain (2018).    Surgical History  Reviewed and not pertinent     Family History  family history includes Alcohol abuse in her maternal grandfather; Cancer in her paternal grandmother.     Social History   reports that she has never smoked. She has never used smokeless tobacco. She reports current alcohol use of about 1.2 oz of alcohol per week. She reports that she does not use drugs.    Allergies  Allergies   Allergen Reactions   • Pcn [Penicillins] Anaphylaxis       Medications  Prior to Admission Medications   Prescriptions Last Dose Informant Patient Reported? Taking?   Diclofenac Sodium (VOLTAREN) 1 % Gel   No No   Sig: Apply 1 Application to skin as directed 4 times a day as needed.   EPINEPHrine 0.3 MG/0.3ML Solution Prefilled Syringe   No No   Si Application by Injection route 1 time daily as needed.   cyclobenzaprine (FLEXERIL) 10 MG Tab   No No   Sig: Take 1 Tab by mouth every bedtime.   sucralfate (CARAFATE) 1 GM Tab   No No   Sig: Take 1 Tab by mouth 4 Times a Day,Before Meals and at Bedtime.   valACYclovir (VALTREX) 500 MG Tab   No No   Sig: Take 1 Tab by mouth 2 times a day.      Facility-Administered Medications: None       Physical Exam  Temp:  [36.5 °C (97.7 °F)] 36.5 °C (97.7 °F)  Pulse:  [] 99  Resp:  [17-22] 17  BP: (110-141)/(51-90) 110/59  SpO2:  [94 %-96 %] 94 %    Physical Exam  Vitals signs reviewed.   Constitutional:       Appearance: Normal appearance.   HENT:      Head: Normocephalic and atraumatic.      Nose: No congestion.   Eyes:      Extraocular Movements: Extraocular movements intact.       Pupils: Pupils are equal, round, and reactive to light.   Neck:      Musculoskeletal: Normal range of motion and neck supple. No muscular tenderness.   Cardiovascular:      Rate and Rhythm: Normal rate and regular rhythm.      Pulses: Normal pulses.      Heart sounds: Normal heart sounds. No murmur.   Pulmonary:      Effort: Pulmonary effort is normal. No respiratory distress.      Breath sounds: Normal breath sounds. No stridor.   Abdominal:      General: Bowel sounds are normal. There is no distension.      Palpations: Abdomen is soft.      Tenderness: There is no tenderness.   Musculoskeletal:         General: No swelling or deformity.   Skin:     General: Skin is warm and dry.      Capillary Refill: Capillary refill takes less than 2 seconds.   Neurological:      General: No focal deficit present.      Mental Status: She is alert and oriented to person, place, and time.   Psychiatric:         Mood and Affect: Mood normal.         Behavior: Behavior normal.         Thought Content: Thought content normal.         Judgment: Judgment normal.      Comments: anxious         Laboratory:  Recent Labs     12/22/19  2315   WBC 9.4   RBC 4.99   HEMOGLOBIN 15.1   HEMATOCRIT 42.0   MCV 84.2   MCH 30.3   MCHC 36.0*   RDW 34.6*   PLATELETCT 257   MPV 9.1     Recent Labs     12/22/19  2315   SODIUM 141   POTASSIUM 4.2   CHLORIDE 104   CO2 22   GLUCOSE 109*   BUN 24*   CREATININE 0.95   CALCIUM 10.4     Recent Labs     12/22/19  2315   ALTSGPT 16   ASTSGOT 19   ALKPHOSPHAT 73   TBILIRUBIN 0.5   LIPASE 26   GLUCOSE 109*         No results for input(s): NTPROBNP in the last 72 hours.      Recent Labs     12/22/19  2315   TROPONINT <6       Urinalysis:    No results found     Imaging:  DX-CHEST-2 VIEWS   Final Result         1.  No acute cardiopulmonary disease.   2.  Symmetric nodular densities overlying the bilateral lung bases, appearance most compatible with nipple shadows, visible on lateral view.             Assessment/Plan:  I anticipate this patient is appropriate for observation status at this time.    * Palpitations  Assessment & Plan  She has sinus tachycardia, with intermittent chest pain, anxiety, impending doom   She has had intermittent vomiting/diarrhea this week which has improved  Suspect probably anxiety and hypovolemia   Bed side ultrasound concern from ERP for pericardial effusion, will check formal echo   Cardiac monitoring   Serial troponin   Prn ativan     Elevated BUN  Assessment & Plan  Appears hypovolemic   Cont with IVF and monitor, recheck labs    GERD (gastroesophageal reflux disease)  Assessment & Plan  This seems more consistent with gastric ulceration based on her symptoms   At this time she refuses endoscopy   Con with PPI and carafate    History of cervical fracture- (present on admission)  Assessment & Plan  On chronic opiates      VTE prophylaxis: heparin

## 2019-12-23 NOTE — DISCHARGE SUMMARY
Discharge Summary    CHIEF COMPLAINT ON ADMISSION  Chief Complaint   Patient presents with   • Tachycardia   • Dizziness       Reason for Admission  Tachycardia, dizziness     Admission Date  12/22/2019    CODE STATUS  Full Code    HPI & HOSPITAL COURSE  This is a 49 y.o. female here with dizziness and tachycardia.  Patient has known medical history of acid reflux.  Patient presented to the emergency room when she was awoken and noticed her heart rate was elevated on home monitor.  Patient continued to get ready for work and went to work and felt that she still had elevated heart rate.  As she was at work she became increasingly lightheaded and developed palpitations.  Patient states she had noticed some chest pain intermittently over the last week.  Patient was recently being worked up as outpatient for gastric ulcers versus acid reflux disease.  Patient was admitted to CDU for further work-up and monitoring.  Patient was placed on telemetry monitoring with no acute cardiac events noted.  CBC and CMP on admission are essentially benign and noncontributory.  Troponin remained negative.  Chest x-ray showed no acute cardiopulmonary disease.  Echocardiogram was obtained and showed an EF of 65% with normal wall motion.  Patient's tachycardia resolved as did her dizziness.  Patient's vital signs are normal.  Patient is able to ambulate independently.  Patient's lungs are clear on auscultation.  Patient is maintaining oxygen saturations on room air.  Discussed findings with patient who feels it may be related to anxiety or stress.  Encourage patient to try to reduce stress as much as possible and follow-up with her PCP.  Encourage patient to avoid energy drinks and discussed with her PCP possible referral to cardiology for extended cardiac monitoring such as ZIO or Holter.  Patient verbalized understanding and states she is ready for discharge at this time.       Therefore, she is discharged in good and stable condition  to home with close outpatient follow-up.        Discharge Date  12/23/2019    FOLLOW UP ITEMS POST DISCHARGE  Please follow up with Jewels Cheek   Attempt to reduce stress  Avoid energy drinks     DISCHARGE DIAGNOSES  Principal Problem:    Palpitations POA: Unknown  Active Problems:    History of cervical fracture POA: Yes    GERD (gastroesophageal reflux disease) POA: Unknown    Elevated BUN POA: Unknown  Resolved Problems:    * No resolved hospital problems. *      FOLLOW UP    Jewels Cheek, A.P.N.  [address]  [address]  [facility] [address] [phone]    In 1 week  for follow up and to discuss cardiology referral       MEDICATIONS ON DISCHARGE     Medication List      CONTINUE taking these medications      Instructions   cyclobenzaprine 10 MG Tabs  Commonly known as:  FLEXERIL   Take 1 Tab by mouth every bedtime.  Dose:  10 mg        STOP taking these medications    oxyCODONE immediate release 10 MG immediate release tablet  Commonly known as:  ROXICODONE            Allergies  Allergies   Allergen Reactions   • Pcn [Penicillins] Anaphylaxis       DIET  Orders Placed This Encounter   Procedures   • Diet Order Cardiac     Standing Status:   Standing     Number of Occurrences:   1     Order Specific Question:   Diet:     Answer:   Cardiac [6]       ACTIVITY  As tolerated.  Weight bearing as tolerated    CONSULTATIONS  None     PROCEDURES  None     LABORATORY  Lab Results   Component Value Date    SODIUM 141 12/22/2019    POTASSIUM 4.2 12/22/2019    CHLORIDE 104 12/22/2019    CO2 22 12/22/2019    GLUCOSE 109 (H) 12/22/2019    BUN 24 (H) 12/22/2019    CREATININE 0.95 12/22/2019        Lab Results   Component Value Date    WBC 9.4 12/22/2019    HEMOGLOBIN 15.1 12/22/2019    HEMATOCRIT 42.0 12/22/2019    PLATELETCT 257 12/22/2019

## 2019-12-23 NOTE — PROGRESS NOTES
Transported  from yellow  pod, aox4, sr with frequent  pvcs  on monitor, steady on her  feet. Denies pain or sob. Call light within reach. Needs attended. Plan of care discussed and understood.

## 2019-12-23 NOTE — ED TRIAGE NOTES
Pt relates waking up around 1400 with dizziness and tachycardia.  Pt went to work tonight and symptoms got worse.    Pt denies LOC, SOB, CP, ABD pain, N/V.  Pt appears very anxious.

## 2019-12-23 NOTE — CARE PLAN
Problem: Safety  Goal: Will remain free from injury  Outcome: PROGRESSING AS EXPECTED  Intervention: Provide assistance with mobility  Flowsheets (Taken 12/23/2019 9563)  Assistance: Assistance of One  Note:   Anticipate patient's needs.     Problem: Pain Management  Goal: Pain level will decrease to patient's comfort goal  Outcome: PROGRESSING AS EXPECTED  Intervention: Follow pain managment plan developed in collaboration with patient and Interdisciplinary Team  Note:   Administer prescribed pain meds.

## 2019-12-24 LAB — EKG IMPRESSION: NORMAL

## 2019-12-24 PROCEDURE — 93010 ELECTROCARDIOGRAM REPORT: CPT | Performed by: INTERNAL MEDICINE

## 2020-01-01 DIAGNOSIS — M54.2 CHRONIC NECK PAIN: ICD-10-CM

## 2020-01-01 DIAGNOSIS — G89.29 CHRONIC NECK PAIN: ICD-10-CM

## 2020-01-01 RX ORDER — CYCLOBENZAPRINE HCL 10 MG
10 TABLET ORAL
Qty: 180 TAB | Refills: 1 | Status: SHIPPED | OUTPATIENT
Start: 2020-01-01 | End: 2020-01-06 | Stop reason: SDUPTHER

## 2020-01-07 ENCOUNTER — OFFICE VISIT (OUTPATIENT)
Dept: MEDICAL GROUP | Facility: MEDICAL CENTER | Age: 50
End: 2020-01-07
Payer: COMMERCIAL

## 2020-01-07 VITALS
TEMPERATURE: 98.2 F | SYSTOLIC BLOOD PRESSURE: 112 MMHG | HEART RATE: 93 BPM | HEIGHT: 67 IN | DIASTOLIC BLOOD PRESSURE: 80 MMHG | BODY MASS INDEX: 17.42 KG/M2 | OXYGEN SATURATION: 98 % | WEIGHT: 111 LBS

## 2020-01-07 DIAGNOSIS — F41.8 ANXIETY WITH DEPRESSION: ICD-10-CM

## 2020-01-07 DIAGNOSIS — R00.2 PALPITATIONS: ICD-10-CM

## 2020-01-07 PROCEDURE — 99214 OFFICE O/P EST MOD 30 MIN: CPT | Performed by: NURSE PRACTITIONER

## 2020-01-07 RX ORDER — BUSPIRONE HYDROCHLORIDE 10 MG/1
10 TABLET ORAL 2 TIMES DAILY
Qty: 60 TAB | Refills: 1 | Status: SHIPPED
Start: 2020-01-07 | End: 2020-02-27

## 2020-01-07 RX ORDER — ALPRAZOLAM 0.25 MG/1
0.25 TABLET ORAL NIGHTLY PRN
Qty: 10 TAB | Refills: 0 | Status: SHIPPED | OUTPATIENT
Start: 2020-01-07 | End: 2020-01-27 | Stop reason: SDUPTHER

## 2020-01-07 ASSESSMENT — PATIENT HEALTH QUESTIONNAIRE - PHQ9
SUM OF ALL RESPONSES TO PHQ QUESTIONS 1-9: 21
CLINICAL INTERPRETATION OF PHQ2 SCORE: 6
5. POOR APPETITE OR OVEREATING: 3 - NEARLY EVERY DAY

## 2020-01-07 NOTE — PROGRESS NOTES
Subjective:     Ema Smith is a 49 y.o. female who presents with ER visit for palps.    HPI:   Seen in f/u after ER visit for palps.  Sx started in mid-nov.  She found out that her boyfriend was cheating on her.  That has effected all her life.  She has just been working a lot to try and not think about all the stress.  Then on 12/22/19 she woke up and wasn't feeling right.  She checked her HR.  It was 138 bpm.  Didn't go down after several hrs.  Was getting liteheaded and shaking.  Hrs later her hr was 145 bpm.  She is a RN.  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  She is having lots of anxiety.  Effecting her work.  Feels that she is not safe now to work.   she is on oxycodone for chronic pain.    She is going to couples counseling with boyfriend.   EKG and cardiac w/u in hosp was w nl. Echo was wnl.  This was all done in hosp.  She is sched to RTW tomorrow nite.      Patient Active Problem List    Diagnosis Date Noted   • GERD (gastroesophageal reflux disease) 12/23/2019   • History of cervical fracture 11/05/2019   • Chronic neck pain 11/05/2019   • Bilateral breast cysts 09/06/2018   • Genital herpes 09/06/2018       Current medicines (including changes today)  Current Outpatient Medications   Medication Sig Dispense Refill   • busPIRone (BUSPAR) 10 MG Tab tablet Take 1 Tab by mouth 2 times a day. Start taking 1 tab at hs x 1-2 wks then increase to bid 60 Tab 1   • ALPRAZolam (XANAX) 0.25 MG Tab Take 1 Tab by mouth at bedtime as needed for Sleep for up to 14 days. 10 Tab 0   • cyclobenzaprine (FLEXERIL) 10 MG Tab Take 1 Tab by mouth every bedtime. 60 Tab 0     No current facility-administered medications for this visit.        Allergies   Allergen Reactions   • Pcn [Penicillins] Anaphylaxis       ROS  Constitutional: Negative. Negative for fever, chills, weight loss, malaise/fatigue and diaphoresis.   HENT: Negative. Negative for hearing loss, ear pain, nosebleeds, congestion, sore throat, neck  "pain, tinnitus and ear discharge.   Respiratory: Negative. Negative for cough, hemoptysis, sputum production, shortness of breath, wheezing and stridor.   Cardiovascular: Negative. Negative for chest pain, palpitations, orthopnea, claudication, leg swelling and PND.   Gastrointestinal: Denies nausea, vomiting, diarrhea, constipation, heartburn, melena or hematochezia.  Genitourinary: Denies dysuria, hematuria, urinary incontinence, frequency or urgency.        Objective:     /80 (BP Location: Right arm, Patient Position: Sitting)   Pulse 93   Temp 36.8 °C (98.2 °F) (Temporal)   Ht 1.702 m (5' 7\")   Wt 50.3 kg (111 lb)   SpO2 98%  Body mass index is 17.39 kg/m².    Physical Exam:  Vitals reviewed.  Constitutional: Oriented to person, place, and time. appears well-developed and well-nourished. No distress.   Cardiovascular: Normal rate, regular rhythm, normal heart sounds and intact distal pulses. Exam reveals no gallop and no friction rub. No murmur heard. No carotid bruits.   Pulmonary/Chest: Effort normal and breath sounds normal. No stridor. No respiratory distress. no wheezes or rales. exhibits no tenderness.   Musculoskeletal: Normal range of motion. exhibits no edema. aden pedal pulses 2+.  Lymphadenopathy: No cervical or supraclavicular adenopathy.   Neurological: Alert and oriented to person, place, and time. exhibits normal muscle tone.  Skin: Skin is warm and dry. No diaphoresis.   Psychiatric: Normal mood and affect. Behavior is normal.      Assessment and Plan:     The following treatment plan was discussed:    1. Anxiety with depression  busPIRone (BUSPAR) 10 MG Tab tablet    ALPRAZolam (XANAX) 0.25 MG Tab    Patient has been identified as having a positive depression screening. Appropriate orders and counseling have been given.    off work til 2/5/2020.  letter given to pt.  use xanax sparingly for panic attacks.  try buspar hs initially and inc to bid if needed.  f/u 2 wks   2. Palpitations  " busPIRone (BUSPAR) 10 MG Tab tablet    ALPRAZolam (XANAX) 0.25 MG Tab    HOLTER MONITOR/EVENT RECORDER -Cardiology Performed    do event monitor/zio patch.  f/u 2 wks for sx eval, event monitor review and determine if suitability to RTW.  she is getting counseling w/SO         Followup: Return in about 4 weeks (around 2/4/2020).

## 2020-01-07 NOTE — LETTER
January 7, 2020        Patient: Ema Smith   YOB: 1970   Date of Visit: 1/7/2020           To Whom It May Concern:    It is my medical opinion that Ema Smith should remain out of participation until Tuesday, February 4, 2020 due to a medical condition.    If you have any questions or concerns, please don't hesitate to call.        Sincerely,          HARI Pat.  Electronically Signed    [facility]  [address] [address] [phone] (Phone)  [phone] (Fax)

## 2020-01-30 ENCOUNTER — NON-PROVIDER VISIT (OUTPATIENT)
Dept: CARDIOLOGY | Facility: MEDICAL CENTER | Age: 50
End: 2020-01-30
Payer: COMMERCIAL

## 2020-01-30 ENCOUNTER — TELEPHONE (OUTPATIENT)
Dept: CARDIOLOGY | Facility: MEDICAL CENTER | Age: 50
End: 2020-01-30

## 2020-01-30 DIAGNOSIS — I47.10 SVT (SUPRAVENTRICULAR TACHYCARDIA) (HCC): ICD-10-CM

## 2020-01-30 DIAGNOSIS — R00.2 PALPITATIONS: ICD-10-CM

## 2020-01-30 NOTE — TELEPHONE ENCOUNTER
Patient enrolled in the 14 day Zio patch program per DEE DEE Pat. (Amy ADD).    >Currently pending EOS.

## 2020-02-04 ENCOUNTER — OFFICE VISIT (OUTPATIENT)
Dept: MEDICAL GROUP | Facility: MEDICAL CENTER | Age: 50
End: 2020-02-04
Payer: COMMERCIAL

## 2020-02-04 VITALS
BODY MASS INDEX: 18.05 KG/M2 | WEIGHT: 115 LBS | DIASTOLIC BLOOD PRESSURE: 80 MMHG | SYSTOLIC BLOOD PRESSURE: 110 MMHG | HEART RATE: 85 BPM | HEIGHT: 67 IN | OXYGEN SATURATION: 98 % | TEMPERATURE: 97.6 F

## 2020-02-04 DIAGNOSIS — R07.89 ATYPICAL CHEST PAIN: ICD-10-CM

## 2020-02-04 DIAGNOSIS — F41.9 ANXIETY: ICD-10-CM

## 2020-02-04 PROCEDURE — 99214 OFFICE O/P EST MOD 30 MIN: CPT | Performed by: NURSE PRACTITIONER

## 2020-02-04 RX ORDER — BUSPIRONE HYDROCHLORIDE 15 MG/1
15 TABLET ORAL 2 TIMES DAILY
Qty: 60 TAB | Refills: 1 | Status: SHIPPED | OUTPATIENT
Start: 2020-02-04 | End: 2020-04-09 | Stop reason: SDUPTHER

## 2020-02-04 NOTE — PROGRESS NOTES
Subjective:     Ema Smith is a 49 y.o. female who presents with anxiety.    HPI:   Seen in f/u for anxiety.  Pt has been off work d/t anxiety and palpitations.  She presented today to determine ability to RTW.  Upon arrival to room she began having severe chest pain that was worse with taking a deep breath.  o2 2 l/min NC placed.  EKG shows ST only with rare PVC's.  She has a zio patch on also.  Pain mostlty resolved after approx 10 min.  She reports that she has been having some pain but nothing as severe as this pain.    She was started on xanax at nite for sleep and buspar bid at her last appt.  The buspar helping initially but not as well now.    She feels that she would like to RTW on 2/16/2020.      Patient Active Problem List    Diagnosis Date Noted   • GERD (gastroesophageal reflux disease) 12/23/2019   • History of cervical fracture 11/05/2019   • Chronic neck pain 11/05/2019   • Bilateral breast cysts 09/06/2018   • Genital herpes 09/06/2018       Current medicines (including changes today)  Current Outpatient Medications   Medication Sig Dispense Refill   • busPIRone (BUSPAR) 15 MG tablet Take 1 Tab by mouth 2 times a day. 60 Tab 1   • ALPRAZolam (XANAX) 0.25 MG Tab Take 1 Tab by mouth at bedtime as needed for Sleep for up to 14 days. 10 Tab 0   • busPIRone (BUSPAR) 10 MG Tab tablet Take 1 Tab by mouth 2 times a day. Start taking 1 tab at hs x 1-2 wks then increase to bid 60 Tab 1   • cyclobenzaprine (FLEXERIL) 10 MG Tab Take 1 Tab by mouth every bedtime. 60 Tab 0     No current facility-administered medications for this visit.        Allergies   Allergen Reactions   • Pcn [Penicillins] Anaphylaxis       ROS  Constitutional: Negative. Negative for fever, chills, weight loss, malaise/fatigue and diaphoresis.   HENT: Negative. Negative for hearing loss, ear pain, nosebleeds, congestion, sore throat, neck pain, tinnitus and ear discharge.   Respiratory: Negative. Negative for cough, hemoptysis, sputum  "production, shortness of breath, wheezing and stridor.   Cardiovascular: Negative. Negative for chest pain, palpitations, orthopnea, claudication, leg swelling and PND.   Gastrointestinal: Denies nausea, vomiting, diarrhea, constipation, heartburn, melena or hematochezia.  Genitourinary: Denies dysuria, hematuria, urinary incontinence, frequency or urgency.        Objective:     /80 (BP Location: Right arm, Patient Position: Sitting)   Pulse 85   Temp 36.4 °C (97.6 °F) (Temporal)   Ht 1.702 m (5' 7\")   Wt 52.2 kg (115 lb)   SpO2 98%  Body mass index is 18.01 kg/m².    Physical Exam:  Vitals reviewed.  Constitutional: Oriented to person, place, and time. appears well-developed and well-nourished. No distress.   Cardiovascular: Normal rate, regular rhythm, normal heart sounds and intact distal pulses. Exam reveals no gallop and no friction rub. No murmur heard. No carotid bruits.   Pulmonary/Chest: Effort normal and breath sounds normal. No stridor. No respiratory distress. no wheezes or rales. exhibits no tenderness.   Musculoskeletal: Normal range of motion. exhibits no edema. aden pedal pulses 2+.  Lymphadenopathy: No cervical or supraclavicular adenopathy.   Neurological: Alert and oriented to person, place, and time. exhibits normal muscle tone.  Skin: Skin is warm and dry. No diaphoresis.   Psychiatric: Normal mood and affect. Behavior is normal.      Assessment and Plan:     The following treatment plan was discussed:    1. Atypical chest pain  NM-CARDIAC STRESS TEST    EKG during chest pain shows ST with rare PVC.  pain significantly improved after 10 min.  was worse with deep breathing.  do stress test and zio patch.     2. Anxiety  busPIRone (BUSPAR) 15 MG tablet    inc buspar to 15 mg bid.  strict ER precautions given if pain reoccurs.  f/u 2 wks for sx eval and test review.  RTW 2/16/2020 unless sx not controlled.          Followup: Return in about 2 weeks (around 2/18/2020).  "

## 2020-02-04 NOTE — LETTER
February 4, 2020        Patient: Ema Smith   YOB: 1970   Date of Visit: 2/4/2020           To Whom It May Concern:    It is my medical opinion that Ema Smith may return to work without restrictions on Sunday, February 16, 2020.    If you have any questions or concerns, please don't hesitate to call.        Sincerely,          VALERIY Pat  Electronically Signed    [facility]  [address] [facility] [address]  [place] [address] [phone] (Phone)  [phone] (Fax)

## 2020-02-19 DIAGNOSIS — R00.2 PALPITATIONS: ICD-10-CM

## 2020-02-19 PROCEDURE — 0298T PR EXT ECG > 48HR TO 21 DAY REVIEW AND INTERPRETATN: CPT | Performed by: INTERNAL MEDICINE

## 2020-02-19 PROCEDURE — 0296T PR EXT ECG > 48HR TO 21 DAY RCRD W/CONECT INTL RCRD: CPT | Performed by: INTERNAL MEDICINE

## 2020-02-20 ENCOUNTER — TELEPHONE (OUTPATIENT)
Dept: MEDICAL GROUP | Facility: MEDICAL CENTER | Age: 50
End: 2020-02-20

## 2020-02-20 NOTE — TELEPHONE ENCOUNTER
Please let pt know that the event monitor shows some PVC's that were assoc with sx.  There was 1 short run of SVT.  No evidence that she had an sx during this time. There was also some SINUS tachycardia/regular fast heartrate.  This occurred during rest and once episode at 7:30 pm.  If she was exercising during this time then no concerns.  If she had any sx during the other times she can come in and we can discuss what to do

## 2020-02-27 ENCOUNTER — OFFICE VISIT (OUTPATIENT)
Dept: MEDICAL GROUP | Facility: MEDICAL CENTER | Age: 50
End: 2020-02-27
Payer: COMMERCIAL

## 2020-02-27 VITALS
SYSTOLIC BLOOD PRESSURE: 106 MMHG | TEMPERATURE: 97.6 F | HEIGHT: 67 IN | BODY MASS INDEX: 18.24 KG/M2 | DIASTOLIC BLOOD PRESSURE: 60 MMHG | WEIGHT: 116.2 LBS | HEART RATE: 89 BPM | OXYGEN SATURATION: 100 %

## 2020-02-27 DIAGNOSIS — R00.2 PALPITATIONS: ICD-10-CM

## 2020-02-27 DIAGNOSIS — R07.89 OTHER CHEST PAIN: ICD-10-CM

## 2020-02-27 DIAGNOSIS — F41.9 ANXIETY: ICD-10-CM

## 2020-02-27 PROCEDURE — 99214 OFFICE O/P EST MOD 30 MIN: CPT | Performed by: NURSE PRACTITIONER

## 2020-02-27 NOTE — PROGRESS NOTES
Subjective:     Ema Smith is a 49 y.o. female who presents with palpitations and chest pain.    HPI:   Seen in f/u for palpitations and chest pain.  When she was last seen she has a severe panic attack.  She has had her zio patch done.  Showed ST, short SVT and some PVC's that she felt.  She continues to have daily episodes of anxiety.  She is sched for her stress test soon.  She is on the buspar but not getting rid of the sx.  She is using sparingly.  Reviewed zio patch results with pt in detail.      Patient Active Problem List    Diagnosis Date Noted   • GERD (gastroesophageal reflux disease) 12/23/2019   • History of cervical fracture 11/05/2019   • Chronic neck pain 11/05/2019   • Bilateral breast cysts 09/06/2018   • Genital herpes 09/06/2018       Current medicines (including changes today)  Current Outpatient Medications   Medication Sig Dispense Refill   • sertraline (ZOLOFT) 50 MG Tab Take 1 Tab by mouth every day. 30 Tab 1   • ALPRAZolam (XANAX) 0.25 MG Tab Take 1 Tab by mouth at bedtime as needed for Sleep for up to 14 days. 10 Tab 0   • busPIRone (BUSPAR) 15 MG tablet Take 1 Tab by mouth 2 times a day. 60 Tab 1   • cyclobenzaprine (FLEXERIL) 10 MG Tab Take 1 Tab by mouth every bedtime. 60 Tab 0     No current facility-administered medications for this visit.        Allergies   Allergen Reactions   • Pcn [Penicillins] Anaphylaxis       ROS  Constitutional: Negative. Negative for fever, chills, weight loss, malaise/fatigue and diaphoresis.   HENT: Negative. Negative for hearing loss, ear pain, nosebleeds, congestion, sore throat, neck pain, tinnitus and ear discharge.   Respiratory: Negative. Negative for cough, hemoptysis, sputum production, shortness of breath, wheezing and stridor.   Cardiovascular: Negative. Negative for orthopnea, claudication, leg swelling and PND.   Gastrointestinal: Denies nausea, vomiting, diarrhea, constipation, heartburn, melena or hematochezia.  Genitourinary: Denies  "dysuria, hematuria, urinary incontinence, frequency or urgency.        Objective:     /60 (BP Location: Right arm, Patient Position: Sitting, BP Cuff Size: Adult)   Pulse 89   Temp 36.4 °C (97.6 °F) (Temporal)   Ht 1.702 m (5' 7\")   Wt 52.7 kg (116 lb 3.2 oz)   SpO2 100%  Body mass index is 18.2 kg/m².    Physical Exam:  Vitals reviewed.  Constitutional: Oriented to person, place, and time. appears well-developed and well-nourished. No distress.   Cardiovascular: Normal rate, regular rhythm, normal heart sounds and intact distal pulses. Exam reveals no gallop and no friction rub. No murmur heard. No carotid bruits.   Pulmonary/Chest: Effort normal and breath sounds normal. No stridor. No respiratory distress. no wheezes or rales. exhibits no tenderness.   Musculoskeletal: Normal range of motion. exhibits no edema. aden pedal pulses 2+.  Lymphadenopathy: No cervical or supraclavicular adenopathy.   Neurological: Alert and oriented to person, place, and time. exhibits normal muscle tone.  Skin: Skin is warm and dry. No diaphoresis.   Psychiatric: Normal mood and affect. Behavior is normal.      Assessment and Plan:     The following treatment plan was discussed:    1. Anxiety  sertraline (ZOLOFT) 50 MG Tab    buspar not controlled sx.  add zoloft.  do stress test and f/u 1 mo for test review and sx eval   2. Other chest pain      occuring with anxiety.  will do stress test to assess for ischemia   3. Palpitations      zio patch shows all benign arrhythmias   4.  Reviewed risks and benefits of zoloft with pt including but not limited to: serotonin syndrome, nausea        Followup: Return in about 4 weeks (around 3/26/2020).  "

## 2020-03-06 ENCOUNTER — HOSPITAL ENCOUNTER (OUTPATIENT)
Dept: RADIOLOGY | Facility: MEDICAL CENTER | Age: 50
End: 2020-03-06
Attending: NURSE PRACTITIONER
Payer: COMMERCIAL

## 2020-03-06 DIAGNOSIS — R07.89 ATYPICAL CHEST PAIN: ICD-10-CM

## 2020-03-06 PROCEDURE — 93017 CV STRESS TEST TRACING ONLY: CPT

## 2020-03-06 PROCEDURE — 700111 HCHG RX REV CODE 636 W/ 250 OVERRIDE (IP)

## 2020-03-06 RX ORDER — REGADENOSON 0.08 MG/ML
INJECTION, SOLUTION INTRAVENOUS
Status: COMPLETED
Start: 2020-03-06 | End: 2020-03-06

## 2020-03-06 RX ORDER — REGADENOSON 0.08 MG/ML
0.4 INJECTION, SOLUTION INTRAVENOUS ONCE
Status: COMPLETED | OUTPATIENT
Start: 2020-03-06 | End: 2020-03-06

## 2020-03-06 RX ADMIN — REGADENOSON 0.4 MG: 0.08 INJECTION, SOLUTION INTRAVENOUS at 09:06

## 2020-03-08 ENCOUNTER — TELEPHONE (OUTPATIENT)
Dept: MEDICAL GROUP | Facility: MEDICAL CENTER | Age: 50
End: 2020-03-08

## 2020-03-08 NOTE — LETTER
March 12, 2020        Ema Smith  [address] [place] [address]        Dear Ema:    [facility]'s office has tried contacting you. At your earliest convenience please contact our office at [phone].      If you have any questions or concerns, please don't hesitate to call.        Sincerely,        HARI Pat.    Electronically Signed

## 2020-03-11 NOTE — TELEPHONE ENCOUNTER
Left a message for patient to call back at [phone].     Valerie Guzman  [facility] Assistant

## 2020-04-02 ENCOUNTER — OFFICE VISIT (OUTPATIENT)
Dept: MEDICAL GROUP | Facility: MEDICAL CENTER | Age: 50
End: 2020-04-02
Payer: COMMERCIAL

## 2020-04-02 VITALS
HEART RATE: 77 BPM | OXYGEN SATURATION: 96 % | SYSTOLIC BLOOD PRESSURE: 126 MMHG | TEMPERATURE: 98 F | WEIGHT: 116 LBS | HEIGHT: 67 IN | BODY MASS INDEX: 18.21 KG/M2 | DIASTOLIC BLOOD PRESSURE: 90 MMHG

## 2020-04-02 DIAGNOSIS — R03.0 ELEVATED BLOOD PRESSURE READING: ICD-10-CM

## 2020-04-02 DIAGNOSIS — F41.9 ANXIETY: ICD-10-CM

## 2020-04-02 PROCEDURE — 99214 OFFICE O/P EST MOD 30 MIN: CPT | Performed by: NURSE PRACTITIONER

## 2020-04-02 RX ORDER — HYDROXYZINE HYDROCHLORIDE 25 MG/1
25 TABLET, FILM COATED ORAL 3 TIMES DAILY PRN
Qty: 90 TAB | Refills: 0 | Status: SHIPPED | OUTPATIENT
Start: 2020-04-02 | End: 2020-05-01 | Stop reason: SDUPTHER

## 2020-04-02 ASSESSMENT — FIBROSIS 4 INDEX: FIB4 SCORE: 0.87

## 2020-04-02 NOTE — PROGRESS NOTES
Subjective:     Ema Smith is a 49 y.o. female who presents with anxiety.    HPI:   Seen in f/u for anxiety.  Over the last several days she has had headache all over her head.  Today in office her DBP is elelvated. She just finished her work yesterday.    She has anxiety.  She has been taking xanax regulalry.  Doesn't like taking it.  Is causes her nausea.  She is still having her chest pain with the anxiety.  She had to call in sick with the chest pain on her last shift. She is taking zoloft and buspar approp.      Patient Active Problem List    Diagnosis Date Noted   • GERD (gastroesophageal reflux disease) 12/23/2019   • History of cervical fracture 11/05/2019   • Chronic neck pain 11/05/2019   • Bilateral breast cysts 09/06/2018   • Genital herpes 09/06/2018       Current medicines (including changes today)  Current Outpatient Medications   Medication Sig Dispense Refill   • hydrOXYzine HCl (ATARAX) 25 MG Tab Take 1 Tab by mouth 3 times a day as needed for Itching. 90 Tab 0   • sertraline (ZOLOFT) 50 MG Tab Take 1 Tab by mouth every day. 30 Tab 1   • busPIRone (BUSPAR) 15 MG tablet Take 1 Tab by mouth 2 times a day. 60 Tab 1   • cyclobenzaprine (FLEXERIL) 10 MG Tab Take 1 Tab by mouth every bedtime. 60 Tab 0     No current facility-administered medications for this visit.        Allergies   Allergen Reactions   • Pcn [Penicillins] Anaphylaxis       ROS  Constitutional: Negative. Negative for fever, chills, weight loss, malaise/fatigue and diaphoresis.   HENT: Negative. Negative for hearing loss, ear pain, nosebleeds, congestion, sore throat, neck pain, tinnitus and ear discharge.   Respiratory: Negative. Negative for cough, hemoptysis, sputum production, shortness of breath, wheezing and stridor.   Cardiovascular: Negative. Negative for chest pain, palpitations, orthopnea, claudication, leg swelling and PND.   Gastrointestinal: Denies nausea, vomiting, diarrhea, constipation, heartburn, melena or  "hematochezia.  Genitourinary: Denies dysuria, hematuria, urinary incontinence, frequency or urgency.        Objective:     /90 (BP Location: Right arm, Patient Position: Sitting)   Pulse 77   Temp 36.7 °C (98 °F) (Temporal)   Ht 1.702 m (5' 7\")   Wt 52.6 kg (116 lb)   SpO2 96%  Body mass index is 18.17 kg/m².    Physical Exam:  Vitals reviewed.  Constitutional: Oriented to person, place, and time. appears well-developed and well-nourished. No distress.   Cardiovascular: Normal rate, regular rhythm, normal heart sounds and intact distal pulses. Exam reveals no gallop and no friction rub. No murmur heard. No carotid bruits.   Pulmonary/Chest: Effort normal and breath sounds normal. No stridor. No respiratory distress. no wheezes or rales. exhibits no tenderness.   Musculoskeletal: Normal range of motion. exhibits no edema. aden pedal pulses 2+.  Lymphadenopathy: No cervical or supraclavicular adenopathy.   Neurological: Alert and oriented to person, place, and time. exhibits normal muscle tone.  Skin: Skin is warm and dry. No diaphoresis.   Psychiatric: Normal mood and affect. Behavior is normal.      Assessment and Plan:     The following treatment plan was discussed:    1. Anxiety  hydrOXYzine HCl (ATARAX) 25 MG Tab    add atarax tid prn for anxiety.  f/u 1 mo for sx eval.  dc xanax.  xontinue zoloft and buspar   2. Elevated blood pressure reading      monitor bp.  do alb/cr ratio.  f/u 1 mo for bp check and sx eval and lab review         Followup: Return in about 4 weeks (around 4/30/2020).  "

## 2020-05-01 DIAGNOSIS — F41.9 ANXIETY: ICD-10-CM

## 2020-05-04 RX ORDER — HYDROXYZINE HYDROCHLORIDE 25 MG/1
25 TABLET, FILM COATED ORAL 3 TIMES DAILY PRN
Qty: 90 TAB | Refills: 0 | Status: SHIPPED | OUTPATIENT
Start: 2020-05-04 | End: 2020-06-03 | Stop reason: SDUPTHER

## 2020-06-03 DIAGNOSIS — F41.9 ANXIETY: ICD-10-CM

## 2020-06-03 RX ORDER — HYDROXYZINE HYDROCHLORIDE 25 MG/1
25 TABLET, FILM COATED ORAL 3 TIMES DAILY PRN
Qty: 90 TAB | Refills: 0 | Status: SHIPPED | OUTPATIENT
Start: 2020-06-03 | End: 2020-07-02 | Stop reason: SDUPTHER

## 2020-08-14 ENCOUNTER — HOSPITAL ENCOUNTER (OUTPATIENT)
Dept: LAB | Facility: MEDICAL CENTER | Age: 50
End: 2020-08-14
Payer: COMMERCIAL

## 2020-08-14 LAB
BDY FAT % MEASURED: 19.2 %
BP DIAS: 62 MMHG
BP SYS: 106 MMHG
CHOLEST SERPL-MCNC: 170 MG/DL (ref 100–199)
DIABETES HTDIA: NO
EVENT NAME HTEVT: NORMAL
FASTING HTFAS: YES
GLUCOSE SERPL-MCNC: 80 MG/DL (ref 65–99)
HDLC SERPL-MCNC: 61 MG/DL
HYPERTENSION HTHYP: NO
LDLC SERPL CALC-MCNC: 95 MG/DL
SCREENING LOC CITY HTCIT: NORMAL
SCREENING LOC STATE HTSTA: NORMAL
SCREENING LOCATION HTLOC: NORMAL
SMOKING HTSMO: NO
SUBSCRIBER ID HTSID: NORMAL
TRIGL SERPL-MCNC: 68 MG/DL (ref 0–149)

## 2020-08-14 PROCEDURE — 82947 ASSAY GLUCOSE BLOOD QUANT: CPT

## 2020-08-14 PROCEDURE — 36415 COLL VENOUS BLD VENIPUNCTURE: CPT

## 2020-08-14 PROCEDURE — S5190 WELLNESS ASSESSMENT BY NONPH: HCPCS

## 2020-08-14 PROCEDURE — 80061 LIPID PANEL: CPT

## 2020-08-16 DIAGNOSIS — F41.9 ANXIETY: ICD-10-CM

## 2020-09-21 ENCOUNTER — IMMUNIZATION (OUTPATIENT)
Dept: OCCUPATIONAL MEDICINE | Facility: CLINIC | Age: 50
End: 2020-09-21

## 2020-09-21 DIAGNOSIS — Z23 NEED FOR VACCINATION: ICD-10-CM

## 2020-09-21 PROCEDURE — 90686 IIV4 VACC NO PRSV 0.5 ML IM: CPT | Performed by: PREVENTIVE MEDICINE

## 2020-12-17 DIAGNOSIS — Z23 NEED FOR VACCINATION: ICD-10-CM

## 2020-12-23 DIAGNOSIS — G89.29 CHRONIC NECK PAIN: ICD-10-CM

## 2020-12-23 DIAGNOSIS — M54.2 CHRONIC NECK PAIN: ICD-10-CM

## 2020-12-23 DIAGNOSIS — F41.9 ANXIETY: ICD-10-CM

## 2020-12-24 RX ORDER — CYCLOBENZAPRINE HCL 10 MG
TABLET ORAL
Qty: 180 TAB | Refills: 0 | Status: SHIPPED | OUTPATIENT
Start: 2020-12-24 | End: 2021-02-25 | Stop reason: SDUPTHER

## 2020-12-27 DIAGNOSIS — Z11.59 SPECIAL SCREENING EXAMINATION FOR VIRAL DISEASE: ICD-10-CM

## 2020-12-28 ENCOUNTER — HOSPITAL ENCOUNTER (OUTPATIENT)
Dept: LAB | Facility: MEDICAL CENTER | Age: 50
End: 2020-12-28
Attending: EMERGENCY MEDICINE
Payer: COMMERCIAL

## 2020-12-28 LAB
COVID ORDER STATUS COVID19: NORMAL
SARS-COV-2 RNA RESP QL NAA+PROBE: DETECTED
SPECIMEN SOURCE: ABNORMAL

## 2021-01-30 DIAGNOSIS — F41.9 ANXIETY: ICD-10-CM

## 2021-02-01 DIAGNOSIS — F41.9 ANXIETY: ICD-10-CM

## 2021-02-02 RX ORDER — HYDROXYZINE HYDROCHLORIDE 25 MG/1
TABLET, FILM COATED ORAL
Qty: 90 TAB | Refills: 6 | Status: SHIPPED
Start: 2021-02-02 | End: 2021-02-25

## 2021-02-02 RX ORDER — HYDROXYZINE HYDROCHLORIDE 25 MG/1
25 TABLET, FILM COATED ORAL 3 TIMES DAILY PRN
Qty: 90 TAB | Refills: 6 | Status: SHIPPED | OUTPATIENT
Start: 2021-02-02 | End: 2021-02-25 | Stop reason: SDUPTHER

## 2021-02-25 ENCOUNTER — OFFICE VISIT (OUTPATIENT)
Dept: MEDICAL GROUP | Facility: MEDICAL CENTER | Age: 51
End: 2021-02-25
Payer: COMMERCIAL

## 2021-02-25 VITALS
OXYGEN SATURATION: 97 % | HEART RATE: 97 BPM | DIASTOLIC BLOOD PRESSURE: 74 MMHG | SYSTOLIC BLOOD PRESSURE: 100 MMHG | WEIGHT: 118 LBS | BODY MASS INDEX: 18.52 KG/M2 | HEIGHT: 67 IN | TEMPERATURE: 98.1 F

## 2021-02-25 DIAGNOSIS — R00.2 PALPITATIONS: ICD-10-CM

## 2021-02-25 DIAGNOSIS — G89.29 CHRONIC NECK PAIN: ICD-10-CM

## 2021-02-25 DIAGNOSIS — M54.2 CHRONIC NECK PAIN: ICD-10-CM

## 2021-02-25 DIAGNOSIS — G44.89 OTHER HEADACHE SYNDROME: ICD-10-CM

## 2021-02-25 DIAGNOSIS — F41.9 ANXIETY: ICD-10-CM

## 2021-02-25 PROBLEM — F33.41 RECURRENT MAJOR DEPRESSIVE DISORDER, IN PARTIAL REMISSION (HCC): Status: ACTIVE | Noted: 2021-02-25

## 2021-02-25 PROBLEM — F41.1 GENERALIZED ANXIETY DISORDER: Status: ACTIVE | Noted: 2021-02-25

## 2021-02-25 PROCEDURE — 99214 OFFICE O/P EST MOD 30 MIN: CPT | Performed by: NURSE PRACTITIONER

## 2021-02-25 RX ORDER — HYDROXYZINE HYDROCHLORIDE 25 MG/1
25 TABLET, FILM COATED ORAL EVERY 6 HOURS PRN
Qty: 360 TABLET | Refills: 1 | Status: SHIPPED | OUTPATIENT
Start: 2021-02-25 | End: 2021-06-16 | Stop reason: SDUPTHER

## 2021-02-25 RX ORDER — CYCLOBENZAPRINE HCL 10 MG
10 TABLET ORAL 3 TIMES DAILY PRN
Qty: 270 TABLET | Refills: 3 | Status: SHIPPED | OUTPATIENT
Start: 2021-02-25 | End: 2021-03-03 | Stop reason: SDUPTHER

## 2021-02-25 RX ORDER — BUTALBITAL, ACETAMINOPHEN AND CAFFEINE 50; 325; 40 MG/1; MG/1; MG/1
1 TABLET ORAL EVERY 4 HOURS PRN
Qty: 10 TABLET | Refills: 0 | Status: SHIPPED | OUTPATIENT
Start: 2021-02-25 | End: 2021-04-07 | Stop reason: SDUPTHER

## 2021-02-25 ASSESSMENT — PATIENT HEALTH QUESTIONNAIRE - PHQ9
7. TROUBLE CONCENTRATING ON THINGS, SUCH AS READING THE NEWSPAPER OR WATCHING TELEVISION: NOT AT ALL
4. FEELING TIRED OR HAVING LITTLE ENERGY: NOT AT ALL
6. FEELING BAD ABOUT YOURSELF - OR THAT YOU ARE A FAILURE OR HAVE LET YOURSELF OR YOUR FAMILY DOWN: NOT AL ALL
9. THOUGHTS THAT YOU WOULD BE BETTER OFF DEAD, OR OF HURTING YOURSELF: NOT AT ALL
SUM OF ALL RESPONSES TO PHQ9 QUESTIONS 1 AND 2: 0
5. POOR APPETITE OR OVEREATING: NOT AT ALL
3. TROUBLE FALLING OR STAYING ASLEEP OR SLEEPING TOO MUCH: NOT AT ALL
SUM OF ALL RESPONSES TO PHQ QUESTIONS 1-9: 0
2. FEELING DOWN, DEPRESSED, IRRITABLE, OR HOPELESS: NOT AT ALL
8. MOVING OR SPEAKING SO SLOWLY THAT OTHER PEOPLE COULD HAVE NOTICED. OR THE OPPOSITE, BEING SO FIGETY OR RESTLESS THAT YOU HAVE BEEN MOVING AROUND A LOT MORE THAN USUAL: NOT AT ALL
1. LITTLE INTEREST OR PLEASURE IN DOING THINGS: NOT AT ALL

## 2021-02-25 ASSESSMENT — FIBROSIS 4 INDEX: FIB4 SCORE: 0.89

## 2021-02-25 NOTE — PROGRESS NOTES
Subjective:     Ema Smith is a 50 y.o. female who presents with  chest pain and headache after COVID.    HPI:   Seen in f/u for chest pain and headache after COVID.  She was dx with COVID on 12/28/2020.  Still having the headache and sometimes chest pain since then.  She is having some palp with and w/o the chest pain.  Having more headaches than chest pain.  Also having the racing heart palp with the headache.  Overall her HR is higher than pre-COVID.   She is not feeling any difference in anxiety but will try and inc atarax to see if that helps.  Still having some intermittent diarrhea.  Palp occur several times a week that is strong pounding and sometimes racing.  No change in bp. Headache is almost constant with pain on top of head.  She would like to try fioricet.  A friend told her about it.     She uses flexeril for her chronic neck pain.  She would like to take tid instead of bid.  She did that in the past and it worked better.    Patient Active Problem List    Diagnosis Date Noted   • Generalized anxiety disorder 02/25/2021   • Recurrent major depressive disorder, in partial remission (HCC) 02/25/2021   • GERD (gastroesophageal reflux disease) 12/23/2019   • History of cervical fracture 11/05/2019   • Chronic neck pain 11/05/2019   • Bilateral breast cysts 09/06/2018   • Genital herpes 09/06/2018       Current medicines (including changes today)  Current Outpatient Medications   Medication Sig Dispense Refill   • cyclobenzaprine (FLEXERIL) 10 mg Tab Take 1 tablet by mouth 3 times a day as needed. 270 tablet 3   • hydrOXYzine HCl (ATARAX) 25 MG Tab Take 1 tablet by mouth every 6 hours as needed for Itching. 360 tablet 1   • butalbital/apap/caffeine -40 mg (FIORICET) -40 MG Tab Take 1 tablet by mouth every four hours as needed for Headache for up to 30 days. 10 tablet 0   • sertraline (ZOLOFT) 50 MG Tab TAKE ONE TABLET BY MOUTH EVERY DAY 90 Tab 0   • busPIRone (BUSPAR) 15 MG tablet Take 1 Tab  "by mouth 2 times a day. 180 Tab 1     No current facility-administered medications for this visit.       Allergies   Allergen Reactions   • Pcn [Penicillins] Anaphylaxis       ROS  Constitutional: Negative. Negative for fever, chills, weight loss, malaise/fatigue and diaphoresis.   HENT: Negative. Negative for hearing loss, ear pain, nosebleeds, congestion, sore throat, neck pain, tinnitus and ear discharge.   Respiratory: Negative. Negative for cough, hemoptysis, sputum production, shortness of breath, wheezing and stridor.   Cardiovascular: Negative. Negative for chest pain, palpitations, orthopnea, claudication, leg swelling and PND.   Gastrointestinal: Denies nausea, vomiting, diarrhea, constipation, heartburn, melena or hematochezia.  Genitourinary: Denies dysuria, hematuria, urinary incontinence, frequency or urgency.        Objective:     /74 (BP Location: Right arm, Patient Position: Sitting)   Pulse 97   Temp 36.7 °C (98.1 °F) (Temporal)   Ht 1.702 m (5' 7\")   Wt 53.5 kg (118 lb)   SpO2 97%  Body mass index is 18.48 kg/m².    Physical Exam:  Vitals reviewed.  Constitutional: Oriented to person, place, and time. appears well-developed and well-nourished. No distress.   Cardiovascular: Normal rate, regular rhythm, normal heart sounds and intact distal pulses. Exam reveals no gallop and no friction rub. No murmur heard. No carotid bruits.   Pulmonary/Chest: Effort normal and breath sounds normal. No stridor. No respiratory distress. no wheezes or rales. exhibits no tenderness.   Musculoskeletal: Normal range of motion. exhibits no edema. aden pedal pulses 2+.  Lymphadenopathy: No cervical or supraclavicular adenopathy.   Neurological: Alert and oriented to person, place, and time. exhibits normal muscle tone.  Skin: Skin is warm and dry. No diaphoresis.   Psychiatric: Normal mood and affect. Behavior is normal.      Assessment and Plan:     The following treatment plan was discussed:    1. Chronic " neck pain  cyclobenzaprine (FLEXERIL) 10 mg Tab    + chronic neck pain.  followed by [facility].  inc flexeril to tid     2. Anxiety  hydrOXYzine HCl (ATARAX) 25 MG Tab    will increase atarax to 4x/day to see if anxiety is causing some of her sx of headache, palps and chest pain   3. Other headache syndrome  butalbital/apap/caffeine -40 mg (FIORICET) -40 MG Tab    add fiorcet for headache after COVID.     4. Palpitations      bp stable so will monitor for now.           Followup: Return in about 6 months (around 8/25/2021).

## 2021-03-03 DIAGNOSIS — M54.2 CHRONIC NECK PAIN: ICD-10-CM

## 2021-03-03 DIAGNOSIS — G89.29 CHRONIC NECK PAIN: ICD-10-CM

## 2021-03-03 RX ORDER — CYCLOBENZAPRINE HCL 10 MG
10 TABLET ORAL 3 TIMES DAILY PRN
Qty: 270 TABLET | Refills: 3 | Status: SHIPPED | OUTPATIENT
Start: 2021-03-03 | End: 2021-05-17 | Stop reason: SDUPTHER

## 2021-03-22 ENCOUNTER — EH NON-PROVIDER (OUTPATIENT)
Dept: OCCUPATIONAL MEDICINE | Facility: CLINIC | Age: 51
End: 2021-03-22

## 2021-03-22 DIAGNOSIS — Z02.89 ENCOUNTER FOR OCCUPATIONAL HEALTH EXAMINATION INVOLVING RESPIRATOR: ICD-10-CM

## 2021-03-22 PROCEDURE — 94375 RESPIRATORY FLOW VOLUME LOOP: CPT | Performed by: NURSE PRACTITIONER

## 2021-04-07 ENCOUNTER — OFFICE VISIT (OUTPATIENT)
Dept: URGENT CARE | Facility: PHYSICIAN GROUP | Age: 51
End: 2021-04-07
Payer: COMMERCIAL

## 2021-04-07 VITALS
SYSTOLIC BLOOD PRESSURE: 112 MMHG | BODY MASS INDEX: 19.53 KG/M2 | OXYGEN SATURATION: 99 % | HEIGHT: 67 IN | DIASTOLIC BLOOD PRESSURE: 72 MMHG | TEMPERATURE: 98.1 F | HEART RATE: 65 BPM | RESPIRATION RATE: 12 BRPM | WEIGHT: 124.4 LBS

## 2021-04-07 DIAGNOSIS — H66.90 ACUTE OTITIS MEDIA, UNSPECIFIED OTITIS MEDIA TYPE: ICD-10-CM

## 2021-04-07 DIAGNOSIS — H92.11 EAR DRAINAGE RIGHT: ICD-10-CM

## 2021-04-07 DIAGNOSIS — H92.01 OTALGIA OF RIGHT EAR: ICD-10-CM

## 2021-04-07 PROCEDURE — 99214 OFFICE O/P EST MOD 30 MIN: CPT | Performed by: NURSE PRACTITIONER

## 2021-04-07 RX ORDER — AZITHROMYCIN 250 MG/1
TABLET, FILM COATED ORAL
Qty: 6 TABLET | Refills: 0 | Status: SHIPPED | OUTPATIENT
Start: 2021-04-07 | End: 2021-05-22

## 2021-04-07 ASSESSMENT — LIFESTYLE VARIABLES: SUBSTANCE_ABUSE: 0

## 2021-04-07 ASSESSMENT — ENCOUNTER SYMPTOMS
HEADACHES: 0
FEVER: 0
NAUSEA: 0
CHILLS: 0
SINUS PAIN: 0

## 2021-04-07 ASSESSMENT — FIBROSIS 4 INDEX: FIB4 SCORE: 0.91

## 2021-04-07 NOTE — PROGRESS NOTES
Ema Smith is a 51 y.o. female who presents for Otalgia (right ear pain x3 days, drainage muffled hearing )      HPI this is a new problem.  Ema is a 51-year-old female presents with right-sided ear pain for 3 days.  She has had drainage out of her right ear for the past 2 days and hearing feels muffled.  Today she did that she had swollen lymph nodes on the right side of her neck that are tender when she touches them.  Treatments tried: Tylenol and ibuprofen.  Is very painful to her.  Symptoms are slowly progressively getting worse.  No other aggravating or alleviating factors.    Review of Systems   Constitutional: Negative for chills, fever and malaise/fatigue.   HENT: Negative for congestion and sinus pain.    Cardiovascular: Negative for chest pain.   Gastrointestinal: Negative for nausea.   Neurological: Negative for headaches.   Endo/Heme/Allergies: Negative for environmental allergies.   Psychiatric/Behavioral: Negative for substance abuse.       Allergies   Allergen Reactions   • Bee Venom Anaphylaxis   • Pcn [Penicillins] Anaphylaxis     Past Medical History:   Diagnosis Date   • Bilateral breast cysts 9/6/2018   • Encounter for long-term (current) use of other medications    • Generalized anxiety disorder 2/25/2021   • Genital herpes 9/6/2018   • Neck pain 9/6/2018   • Recurrent major depressive disorder, in partial remission (HCC) 2/25/2021     Past Surgical History:   Procedure Laterality Date   • OTHER      uterine septation      Family History   Problem Relation Age of Onset   • Alcohol abuse Maternal Grandfather    • Cancer Paternal Grandmother         lung in smoker     Social History     Tobacco Use   • Smoking status: Never Smoker   • Smokeless tobacco: Never Used   Substance Use Topics   • Alcohol use: Yes     Alcohol/week: 1.2 oz     Types: 2 Shots of liquor per week     Comment: ocasionally      Patient Active Problem List   Diagnosis   • Bilateral breast cysts   • Genital herpes   •  "History of cervical fracture   • Chronic neck pain   • GERD (gastroesophageal reflux disease)   • Generalized anxiety disorder   • Recurrent major depressive disorder, in partial remission ([facility])     Current Outpatient Medications on File Prior to Visit   Medication Sig Dispense Refill   • cyclobenzaprine (FLEXERIL) 10 mg Tab Take 1 tablet by mouth 3 times a day as needed. 270 tablet 3   • hydrOXYzine HCl (ATARAX) 25 MG Tab Take 1 tablet by mouth every 6 hours as needed for Itching. 360 tablet 1   • sertraline (ZOLOFT) 50 MG Tab TAKE ONE TABLET BY MOUTH EVERY DAY 90 Tab 0   • busPIRone (BUSPAR) 15 MG tablet Take 1 Tab by mouth 2 times a day. 180 Tab 1     No current facility-administered medications on file prior to visit.          Objective:     /72 (BP Location: Right arm, Patient Position: Sitting, BP Cuff Size: Adult)   Pulse 65   Temp 36.7 °C (98.1 °F) (Temporal)   Resp 12   Ht 1.702 m (5' 7\")   Wt 56.4 kg (124 lb 6.4 oz)   SpO2 99%   BMI 19.48 kg/m²     Physical Exam  Vitals reviewed.   Constitutional:       Appearance: She is normal weight.   HENT:      Right Ear: Drainage, swelling and tenderness present. A middle ear effusion is present. No mastoid tenderness. Tympanic membrane is erythematous and bulging.      Left Ear: Hearing, tympanic membrane, ear canal and external ear normal.      Mouth/Throat:      Mouth: Mucous membranes are moist.   Eyes:      Pupils: Pupils are equal, round, and reactive to light.   Cardiovascular:      Rate and Rhythm: Normal rate.   Musculoskeletal:      Cervical back: Normal range of motion.   Lymphadenopathy:      Head:      Right side of head: No tonsillar adenopathy.      Left side of head: No tonsillar adenopathy.      Cervical: Cervical adenopathy present.      Right cervical: Superficial cervical adenopathy present.      Left cervical: Superficial cervical adenopathy present.   Skin:     General: Skin is warm.      Capillary Refill: Capillary refill takes " less than 2 seconds.   Neurological:      Mental Status: She is alert and oriented to person, place, and time.   Psychiatric:         Mood and Affect: Mood normal.         Behavior: Behavior normal.         Thought Content: Thought content normal.         Judgment: Judgment normal.            Assessment /Associated Orders:      1. Otalgia of right ear  azithromycin (ZITHROMAX) 250 MG Tab   2. Acute otitis media, unspecified otitis media type  azithromycin (ZITHROMAX) 250 MG Tab   3. Ear drainage right  azithromycin (ZITHROMAX) 250 MG Tab       Medical Decision Making:    Pt is clinically stable at today's acute urgent care visit.  No acute distress noted. Appropriate for outpatient management at this time. Educated in proper administration of medication(s) ordered today including safety, possible SE, risks, benefits, rationale and alternatives to therapy.   OTC  analgesic of choice (acetaminophen or NSAID). Follow manufactures dosing and safety precautions. Avoid water into ear canal until feeling better. It appears like the TM is intact today but the drainage is concerning for partial rupture. I would like her to have it     Advised the patient to follow-up with the primary care provider for recheck, reevaluation, and consideration of further management if necessary.   Discussed management options (risks,benefits, and alternatives to treatment). Pt expresses understanding and the treatment plan was agreed upon. Questions were encouraged and answered to pt's satisfaction.       Return to urgent care prn if new or worsening sx or if there is no improvement in condition prn . Red flags discussed and indications to immediately call 911 or present to the Emergency Department.     I personally reviewed prior external notes and test results pertinent to today's visit.  I have independently reviewed and interpreted all diagnostics ordered during this urgent care acute visit.   Time spent evaluating this patient was at  least 30 minutes and includes preparing for visit, counseling/education, exam and evaluation, obtaining history, independent interpretation, ordering lab/test/procedures and medication management.

## 2021-04-21 DIAGNOSIS — F41.9 ANXIETY: ICD-10-CM

## 2021-05-19 ENCOUNTER — OCCUPATIONAL MEDICINE (OUTPATIENT)
Dept: URGENT CARE | Facility: PHYSICIAN GROUP | Age: 51
End: 2021-05-19
Payer: COMMERCIAL

## 2021-05-19 ENCOUNTER — HOSPITAL ENCOUNTER (OUTPATIENT)
Dept: RADIOLOGY | Facility: MEDICAL CENTER | Age: 51
End: 2021-05-19
Attending: FAMILY MEDICINE
Payer: COMMERCIAL

## 2021-05-19 VITALS
DIASTOLIC BLOOD PRESSURE: 64 MMHG | HEIGHT: 67 IN | HEART RATE: 113 BPM | TEMPERATURE: 99.6 F | BODY MASS INDEX: 18.68 KG/M2 | RESPIRATION RATE: 16 BRPM | SYSTOLIC BLOOD PRESSURE: 124 MMHG | WEIGHT: 119 LBS | OXYGEN SATURATION: 99 %

## 2021-05-19 DIAGNOSIS — M62.838 CERVICAL PARASPINAL MUSCLE SPASM: ICD-10-CM

## 2021-05-19 DIAGNOSIS — M62.830 LUMBAR PARASPINAL MUSCLE SPASM: ICD-10-CM

## 2021-05-19 DIAGNOSIS — Z02.1 PRE-EMPLOYMENT DRUG SCREENING: ICD-10-CM

## 2021-05-19 DIAGNOSIS — M54.16 LUMBAR RADICULOPATHY, ACUTE: ICD-10-CM

## 2021-05-19 DIAGNOSIS — S13.9XXA NECK SPRAIN, INITIAL ENCOUNTER: ICD-10-CM

## 2021-05-19 DIAGNOSIS — S33.9XXA SPRAIN, LUMBOSACRAL, INITIAL ENCOUNTER: ICD-10-CM

## 2021-05-19 LAB
AMP AMPHETAMINE: NORMAL
BAR BARBITURATES: NORMAL
BREATH ALCOHOL COMMENT: NORMAL
BZO BENZODIAZEPINES: NORMAL
COC COCAINE: NORMAL
INT CON NEG: NORMAL
INT CON POS: NORMAL
MDMA ECSTASY: NORMAL
MET METHAMPHETAMINES: NORMAL
MTD METHADONE: NORMAL
OPI OPIATES: NORMAL
OXY OXYCODONE: NORMAL
PCP PHENCYCLIDINE: NORMAL
POC BREATHALIZER: 0 PERCENT (ref 0–0.01)
POC URINE DRUG SCREEN OCDRS: NEGATIVE
THC: NORMAL

## 2021-05-19 PROCEDURE — 72100 X-RAY EXAM L-S SPINE 2/3 VWS: CPT

## 2021-05-19 PROCEDURE — 80305 DRUG TEST PRSMV DIR OPT OBS: CPT | Performed by: FAMILY MEDICINE

## 2021-05-19 PROCEDURE — 82075 ASSAY OF BREATH ETHANOL: CPT | Performed by: FAMILY MEDICINE

## 2021-05-19 PROCEDURE — 72040 X-RAY EXAM NECK SPINE 2-3 VW: CPT

## 2021-05-19 PROCEDURE — 99203 OFFICE O/P NEW LOW 30 MIN: CPT | Performed by: FAMILY MEDICINE

## 2021-05-19 RX ORDER — METHYLPREDNISOLONE 4 MG/1
TABLET ORAL
Qty: 21 TABLET | Refills: 0 | Status: SHIPPED
Start: 2021-05-19 | End: 2021-11-18

## 2021-05-19 RX ORDER — IBUPROFEN 200 MG
800 TABLET ORAL EVERY 6 HOURS PRN
COMMUNITY

## 2021-05-19 RX ORDER — CYCLOBENZAPRINE HCL 10 MG
10 TABLET ORAL NIGHTLY PRN
Qty: 10 TABLET | Refills: 0 | Status: SHIPPED | OUTPATIENT
Start: 2021-05-19 | End: 2021-05-19 | Stop reason: ALTCHOICE

## 2021-05-19 RX ORDER — CYCLOBENZAPRINE HCL 10 MG
10 TABLET ORAL NIGHTLY PRN
Qty: 20 TABLET | Refills: 0 | Status: SHIPPED | OUTPATIENT
Start: 2021-05-19 | End: 2021-05-29

## 2021-05-19 ASSESSMENT — ENCOUNTER SYMPTOMS
NAUSEA: 0
SENSORY CHANGE: 0
BACK PAIN: 1
SORE THROAT: 0
VOMITING: 0
TINGLING: 0
FOCAL WEAKNESS: 0
FEVER: 0
MYALGIAS: 0
COUGH: 0
SHORTNESS OF BREATH: 0
DIZZINESS: 0

## 2021-05-19 ASSESSMENT — FIBROSIS 4 INDEX: FIB4 SCORE: 0.91
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
## 2021-05-20 NOTE — PATIENT INSTRUCTIONS
Lumbar Sprain  A lumbar sprain, which is sometimes called a low-back sprain, is a stretch or tear in the bands of tissue that hold bones and joints together (ligaments) in the lower back (lumbar spine). This type of injury occurs when you overextend or stretch these ligaments beyond their limits.  Lumbar sprains can range from mild to severe. Mild sprains may involve stretching a ligament without tearing it. These may heal in 1-2 weeks. More severe sprains involve tearing of the ligament. These will cause more pain and may take 6-8 weeks to heal.  What are the causes?  This condition may be caused by:  · Trauma, such as a fall or a hit to the body.  · Twisting or overstretching the back. This may result from doing activities that need a lot of energy, such as lifting heavy objects.  What increases the risk?  This injury is more common in:  · Athletes.  · People with obesity.  · People who do repeated lifting, bending, or other movements that involve their back.  What are the signs or symptoms?  Symptoms of this condition may include:  · Sharp or dull pain in the lower back that does not go away. The pain may extend to the buttocks.  · Stiffness or limited range of motion.  · Sudden muscle tightening (spasms).  How is this diagnosed?  This condition may be diagnosed based on:  · Your symptoms.  · Your medical history.  · A physical exam.  · Imaging tests, such as:  ? X-rays.  ? MRI.  How is this treated?  Treatment for this condition may include:  · Resting the injured area.  · Applying heat and cold to the affected area.  · Medicines for pain and inflammation, such as NSAIDs.  · Prescription pain medicine and muscle relaxants may be needed for a short time.  · Physical therapy.  Follow these instructions at home:  Managing pain, stiffness, and swelling         · If directed, put ice on the injured area during the first 24 hours after your injury.  ? Put ice in a plastic bag.  ? Place a towel between your skin and  the bag.  ? Leave the ice on for 20 minutes, 2-3 times a day.  · If directed, apply heat to the affected area as often as told by your health care provider. Use the heat source that your health care provider recommends, such as a moist heat pack or a heating pad.  ? Place a towel between your skin and the heat source.  ? Leave the heat on for 20-30 minutes.  ? Remove the heat if your skin turns bright red. This is especially important if you are unable to feel pain, heat, or cold. You may have a greater risk of getting burned.  Activity  · Rest and return to your normal activities as told by your health care provider. Ask your health care provider what activities are safe for you.  · Do exercises as told by your health care provider.  Medicines  · Take over-the-counter and prescription medicines only as told by your health care provider.  · Ask your health care provider if the medicine prescribed to you:  ? Requires you to avoid driving or using heavy machinery.  ? Can cause constipation. You may need to take these actions to prevent or treat constipation:  § Drink enough fluid to keep your urine pale yellow.  § Take over-the-counter or prescription medicines.  § Eat foods that are high in fiber, such as beans, whole grains, and fresh fruits and vegetables.  § Limit foods that are high in fat and processed sugars, such as fried or sweet foods.  Injury prevention  To prevent a future low-back injury:  · Always warm up properly before physical activity or sports.  · Cool down and stretch after being active.  · Use correct form when playing sports and lifting heavy objects. Bend your knees before you lift heavy objects.  · Use good posture when sitting and standing.  · Stay physically fit and keep a healthy weight.  ? Do at least 150 minutes of moderate-intensity exercise each week, such as brisk walking or water aerobics.  ? Do strength exercises at least 2 times each week.    General instructions  · Do not use any  products that contain nicotine or tobacco, such as cigarettes, e-cigarettes, and chewing tobacco. If you need help quitting, ask your health care provider.  · Keep all follow-up visits as told by your health care provider. This is important.  Contact a health care provider if:  · Your back pain does not improve after 6 weeks of treatment.  · Your symptoms get worse.  Get help right away if:  · Your back pain is severe.  · You are unable to stand or walk.  · You develop pain in your legs.  · You develop weakness in your buttocks or legs.  · You have difficulty controlling when you urinate or when you have a bowel movement.  ? You have frequent, painful, or bloody urination.  ? You have a temperature over 101.0°F (38.3°C).  Summary  · A lumbar sprain, which is sometimes called a low-back sprain, is a stretch or tear in the bands of tissue that hold bones and joints together (ligaments) in the lower back (lumbar spine).  · Rest and return to your normal activities as told by your health care provider. Ask your health care provider what activities are safe for you.  · If directed, apply heat and ice to the affected area as often as told by your health care provider.  · Take over-the-counter and prescription medicines only as told by your health care provider.  · Contact a health care provider if you have new or worsening symptoms.  This information is not intended to replace advice given to you by your health care provider. Make sure you discuss any questions you have with your health care provider.  Document Released: 12/18/2006 Document Revised: 10/17/2019 Document Reviewed: 10/17/2019  Elsevier Patient Education © 2020 [employer]  Radicular Pain  Radicular pain is a type of pain that spreads from your back or neck along a spinal nerve. Spinal nerves are nerves that leave the spinal cord and go to the muscles. Radicular pain is sometimes called radiculopathy, radiculitis, or a pinched nerve. When you have this type  of pain, you may also have weakness, numbness, or tingling in the area of your body that is supplied by the nerve. The pain may feel sharp and burning. Depending on which spinal nerve is affected, the pain may occur in the:  · Neck area (cervical radicular pain). You may also feel pain, numbness, weakness, or tingling in the arms.  · Mid-spine area (thoracic radicular pain). You would feel this pain in the back and chest. This type is rare.  · Lower back area (lumbar radicular pain). You would feel this pain as low back pain. You may feel pain, numbness, weakness, or tingling in the buttocks or legs. Sciatica is a type of lumbar radicular pain that shoots down the back of the leg.  Radicular pain occurs when one of the spinal nerves becomes irritated or squeezed (compressed). It is often caused by something pushing on a spinal nerve, such as one of the bones of the spine (vertebrae) or one of the round cushions between vertebrae (intervertebral disks). This can result from:  · An injury.  · Wear and tear or aging of a disk.  · The growth of a bone spur that pushes on the nerve.  Radicular pain often goes away when you follow instructions from your health care provider for relieving pain at home.  Follow these instructions at home:  Managing pain         · If directed, put ice on the affected area:  ? Put ice in a plastic bag.  ? Place a towel between your skin and the bag.  ? Leave the ice on for 20 minutes, 2-3 times a day.  · If directed, apply heat to the affected area as often as told by your health care provider. Use the heat source that your health care provider recommends, such as a moist heat pack or a heating pad.  ? Place a towel between your skin and the heat source.  ? Leave the heat on for 20-30 minutes.  ? Remove the heat if your skin turns bright red. This is especially important if you are unable to feel pain, heat, or cold. You may have a greater risk of getting burned.  Activity    · Do not sit or  rest in bed for long periods of time.  · Try to stay as active as possible. Ask your health care provider what type of exercise or activity is best for you.  · Avoid activities that make your pain worse, such as bending and lifting.  · Do not lift anything that is heavier than 10 lb (4.5 kg), or the limit that you are told, until your health care provider says that it is safe.  · Practice using proper technique when lifting items. Proper lifting technique involves bending your knees and rising up.  · Do strength and range-of-motion exercises only as told by your health care provider or physical therapist.  General instructions  · Take over-the-counter and prescription medicines only as told by your health care provider.  · Pay attention to any changes in your symptoms.  · Keep all follow-up visits as told by your health care provider. This is important.  ? Your health care provider may send you to a physical therapist to help with this pain.  Contact a health care provider if:  · Your pain and other symptoms get worse.  · Your pain medicine is not helping.  · Your pain has not improved after a few weeks of home care.  · You have a fever.  Get help right away if:  · You have severe pain, weakness, or numbness.  · You have difficulty with bladder or bowel control.  Summary  · Radicular pain is a type of pain that spreads from your back or neck along a spinal nerve.  · When you have radicular pain, you may also have weakness, numbness, or tingling in the area of your body that is supplied by the nerve.  · The pain may feel sharp or burning.  · Radicular pain may be treated with ice, heat, medicines, or physical therapy.  This information is not intended to replace advice given to you by your health care provider. Make sure you discuss any questions you have with your health care provider.  Document Released: 01/25/2006 Document Revised: 07/02/2019 Document Reviewed: 07/02/2019  Elsevier Patient Education © 2020  Elsevier Inc.
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
## 2021-05-22 ENCOUNTER — OCCUPATIONAL MEDICINE (OUTPATIENT)
Dept: URGENT CARE | Facility: PHYSICIAN GROUP | Age: 51
End: 2021-05-22
Payer: COMMERCIAL

## 2021-05-22 VITALS
BODY MASS INDEX: 19.15 KG/M2 | HEART RATE: 75 BPM | RESPIRATION RATE: 16 BRPM | TEMPERATURE: 97 F | SYSTOLIC BLOOD PRESSURE: 114 MMHG | DIASTOLIC BLOOD PRESSURE: 68 MMHG | HEIGHT: 67 IN | WEIGHT: 122 LBS | OXYGEN SATURATION: 96 %

## 2021-05-22 DIAGNOSIS — M62.830 LUMBAR PARASPINAL MUSCLE SPASM: ICD-10-CM

## 2021-05-22 DIAGNOSIS — S13.9XXA NECK SPRAIN, INITIAL ENCOUNTER: ICD-10-CM

## 2021-05-22 DIAGNOSIS — M54.16 LUMBAR RADICULOPATHY, ACUTE: ICD-10-CM

## 2021-05-22 DIAGNOSIS — S33.9XXA SPRAIN, LUMBOSACRAL, INITIAL ENCOUNTER: ICD-10-CM

## 2021-05-22 PROCEDURE — 99213 OFFICE O/P EST LOW 20 MIN: CPT | Performed by: FAMILY MEDICINE

## 2021-05-22 ASSESSMENT — ENCOUNTER SYMPTOMS
COUGH: 0
SORE THROAT: 0
FEVER: 0
VOMITING: 0

## 2021-05-22 ASSESSMENT — FIBROSIS 4 INDEX: FIB4 SCORE: 0.91

## 2021-05-22 NOTE — LETTER
[facility] [address] [address]  Phone:  [phone] - Fax:  [phone]   [facility] Progress Report and Disability Certification  Date of Service: 5/22/2021   No Show:  No  Date / Time of Next Visit: 5/29/2021   Claim Information   Patient Name: Ema Smith  Claim Number:     Employer: [employer]  Date of Injury: 5/18/2021     Insurer / TPA: [employer]  ID / SSN:     Occupation: RN  Diagnosis: Diagnoses of Sprain, lumbosacral, initial encounter, Lumbar paraspinal muscle spasm, Neck sprain, initial encounter, and Lumbar radiculopathy, acute were pertinent to this visit.    Medical Information   Related to Industrial Injury? Yes    Subjective Complaints:  Pt presents for evaluation of an acute problem  DOI 5/18/2021  SHANEL acute back injury when transferring a patient  Patient feels she has made great improvement since last visit  Continues to have some pain in the right lower back and neck  Feels that her neck is nearly back to baseline pain  Lower back range of motion improving  Radicular symptoms have resolved  Ambulating without a limp  Would like to trial full duty   Objective Findings: Back:  General: No asymmetry, bruising, or erythema appreciated  ROM: flexion 90°, extension 30°, lateral bend 30°, lateral twist 30°  Palpation: No tenderness to palpation of spinous processes, no step-offs appreciated, +TTP along the right lumbar paraspinal muscles  Neuro: Sensation intact and equal bilaterally in LE's    Neck  General: No asymmetry, bruising, or erythema appreciated  ROM: FROM flex/extend/lateral bend/lateral rotation  Palpation: +TTP along right paraspinal muscles in cervical region and right trapezius  Neuro: Sensation intact and equal bilaterally in UE's  Vascular: Radial pulse 2+    Pre-Existing Condition(s):     Assessment:   Condition Improved    Status: Additional Care Required  Permanent Disability:No    Plan:         Diagnostics:      Comments:       Disability Information   Status: Released to Full Duty    From:  5/22/2021  Through: 5/29/2021 Restrictions are: Temporary   Physical Restrictions   Sitting:    Standing:    Stooping:    Bending:      Squatting:    Walking:    Climbing:    Pushing:      Pulling:    Other:    Reaching Above Shoulder (L):   Reaching Above Shoulder (R):       Reaching Below Shoulder (L):    Reaching Below Shoulder (R):      Not to exceed Weight Limits   Carrying(hrs):   Weight Limit(lb):   Lifting(hrs):   Weight  Limit(lb):     Comments: Doing well, trial full duty.  F/U in 1 week     Repetitive Actions   Hands: i.e. Fine Manipulations from Grasping:     Feet: i.e. Operating Foot Controls:     Driving / Operate Machinery:     Provider Name:   Nicolás Mckeon M.D. Physician Signature:  Physician Name:     Clinic Name / Location: [address] [address] Clinic Phone Number: Dept: [phone]   Appointment Time: 12:00 Pm Visit Start Time: 12:07 PM   Check-In Time:  12:02 Pm Visit Discharge Time:  12:42 PM   Original-Treating Physician or Chiropractor    Page 2-Insurer/TPA    Page 3-Employer    Page 4-Employee

## 2021-05-22 NOTE — PROGRESS NOTES
"Subjective:     Ema Smith is a 51 y.o. 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   HENT: Negative for sore throat.    Respiratory: Negative for cough.    Gastrointestinal: Negative for vomiting.   Skin: Negative for rash.     PMH: No pertinent past medical history to this problem  MEDS: Medications were reviewed in Epic  ALLERGIES: Allergies were reviewed in Epic  FH: No pertinent family history to this problem     Objective:   /68 (BP Location: Right arm, Patient Position: Sitting, BP Cuff Size: Adult)   Pulse 75   Temp 36.1 °C (97 °F) (Temporal)   Resp 16   Ht 1.702 m (5' 7\")   Wt 55.3 kg (122 lb)   SpO2 96%   BMI 19.11 kg/m²     Physical Exam  Constitutional:       General: She is not in acute distress.     Appearance: She is well-developed. She is not diaphoretic.   HENT:      Head: Normocephalic and atraumatic.   Pulmonary:      Effort: Pulmonary effort is normal.   Skin:     General: Skin is warm and dry.      Findings: No erythema.   Neurological:      Mental Status: She is alert.     Back:  General: No asymmetry, bruising, or erythema appreciated  ROM: flexion 90°, extension 30°, lateral bend 30°, lateral twist 30°  Palpation: No tenderness to palpation of spinous processes, no step-offs appreciated, +TTP along the right lumbar paraspinal muscles  Neuro: Sensation intact and equal bilaterally in LE's    Neck  General: No asymmetry, bruising, or erythema appreciated  ROM: FROM flex/extend/lateral bend/lateral " rotation  Palpation: +TTP along right paraspinal muscles in cervical region and right trapezius  Neuro: Sensation intact and equal bilaterally in UE's  Vascular: Radial pulse 2+     Assessment/Plan:   Assessment    1. Sprain, lumbosacral, initial encounter    2. Lumbar paraspinal muscle spasm    3. Neck sprain, initial encounter    4. Lumbar radiculopathy, acute    Patient making good improvements, feels she is ready to tolerate full duty.  Released for trial of full duty and follow-up in 1 week to check.  If going well, may be able to release his MMI at that time.

## 2021-05-29 ENCOUNTER — OCCUPATIONAL MEDICINE (OUTPATIENT)
Dept: URGENT CARE | Facility: PHYSICIAN GROUP | Age: 51
End: 2021-05-29
Payer: COMMERCIAL

## 2021-05-29 VITALS
BODY MASS INDEX: 19.15 KG/M2 | HEIGHT: 67 IN | OXYGEN SATURATION: 97 % | HEART RATE: 83 BPM | DIASTOLIC BLOOD PRESSURE: 78 MMHG | TEMPERATURE: 99.1 F | WEIGHT: 122 LBS | SYSTOLIC BLOOD PRESSURE: 112 MMHG | RESPIRATION RATE: 17 BRPM

## 2021-05-29 DIAGNOSIS — S33.5XXD LUMBAR SPRAIN, SUBSEQUENT ENCOUNTER: ICD-10-CM

## 2021-05-29 DIAGNOSIS — S13.9XXD NECK SPRAIN, SUBSEQUENT ENCOUNTER: ICD-10-CM

## 2021-05-29 PROCEDURE — 99213 OFFICE O/P EST LOW 20 MIN: CPT | Performed by: FAMILY MEDICINE

## 2021-05-29 ASSESSMENT — FIBROSIS 4 INDEX: FIB4 SCORE: 0.91

## 2021-05-29 NOTE — LETTER
[facility] [address] [place] [address]  Phone:  [phone] - Fax:  [phone]   [facility] Progress Report and Disability Certification  Date of Service: 2021   No Show:  No  Date / Time of Next Visit: 6/15/2021   Claim Information   Patient Name: Ema Smith  Claim Number:     Employer: [employer]  Date of Injury: 2021     Insurer / TPA: [employer]  ID / SSN:     Occupation: RN  Diagnosis: Diagnoses of Lumbar sprain, subsequent encounter and Neck sprain, subsequent encounter were pertinent to this visit.    Medical Information   Related to Industrial Injury? Yes    Subjective Complaints:  DOI 2021  Patient is here for follow-up back and neck sprain.  Doing a lot better.  Has been working full-time but also has been using her NSAIDs frequently daily.  Review of system otherwise negative.  She is going on a vacation for 2 weeks and next day at work is .   Objective Findings: In no acute distress     Pre-Existing Condition(s):     Assessment:   Condition Improved    Status: Additional Care Required  Permanent Disability:No    Plan:   Comments:Continue NSAIDs as needed    Diagnostics:      Comments:       Disability Information   Status: Released to Full Duty    From:  2021  Through: 6/15/2021 Restrictions are: Temporary   Physical Restrictions   Sitting:    Standin hrs/day Stooping:    Bending:      Squatting:    Walking:    Climbing:    Pushing:      Pulling:    Other:    Reaching Above Shoulder (L):   Reaching Above Shoulder (R):       Reaching Below Shoulder (L):    Reaching Below Shoulder (R):      Not to exceed Weight Limits   Carrying(hrs):   Weight Limit(lb):   Lifting(hrs):   Weight  Limit(lb):     Comments:      Repetitive Actions   Hands: i.e. Fine Manipulations from Grasping:     Feet: i.e. Operating Foot Controls:     Driving / Operate Machinery:     Provider Name:   Dragan Emmanuel M.D. Physician  Signature:  Physician Name:     Clinic Name / Location[facility] [place] [place] [address] Clinic Phone Number: Dept: [phone]   Appointment Time: 2:00 Pm Visit Start Time: 12:01 PM   Check-In Time:  11:59 Am Visit Discharge Time:  12:24 PM   Original-Treating Physician or Chiropractor    Page 2-Insurer/TPA    Page 3-Employer    Page 4-Employee

## 2021-05-29 NOTE — PROGRESS NOTES
"Subjective:      Ema Smith is a 51 y.o. female who presents with Follow-Up (w/c)      DOI 5/18/2021  Patient is here for follow-up back and neck sprain.  Doing a lot better.  Has been working full-time but also has been using her NSAIDs frequently daily.  Review of system otherwise negative.  She is going on a vacation for 2 weeks and next day at work is June 16.     HPI    ROS       Objective:     /78 (BP Location: Right arm, Patient Position: Sitting, BP Cuff Size: Adult)   Pulse 83   Temp 37.3 °C (99.1 °F) (Temporal)   Resp 17   Ht 1.702 m (5' 7\")   Wt 55.3 kg (122 lb)   SpO2 97%   BMI 19.11 kg/m²      Physical Exam    In no acute distress                Assessment/Plan:        1. Lumbar sprain, subsequent encounter    2. Neck sprain, subsequent encounter    Doing a lot better.  Still using some NSAIDs frequently.  Has 2 weeks of vacation.  Recommended to take it easy and come back on Trice 15 and at that point we can most likely release altogether      "

## 2021-06-16 DIAGNOSIS — F41.9 ANXIETY: ICD-10-CM

## 2021-06-16 RX ORDER — HYDROXYZINE HYDROCHLORIDE 25 MG/1
25 TABLET, FILM COATED ORAL EVERY 6 HOURS PRN
Qty: 360 TABLET | Refills: 1 | Status: SHIPPED | OUTPATIENT
Start: 2021-06-16 | End: 2021-09-09 | Stop reason: SDUPTHER

## 2021-07-23 DIAGNOSIS — F41.9 ANXIETY: ICD-10-CM

## 2021-08-04 ENCOUNTER — TELEPHONE (OUTPATIENT)
Dept: MEDICAL GROUP | Facility: MEDICAL CENTER | Age: 51
End: 2021-08-04

## 2021-08-04 DIAGNOSIS — F41.9 ANXIETY: ICD-10-CM

## 2021-08-04 RX ORDER — BUSPIRONE HYDROCHLORIDE 15 MG/1
15 TABLET ORAL 2 TIMES DAILY
Qty: 60 TABLET | Refills: 0 | Status: SHIPPED | OUTPATIENT
Start: 2021-08-04 | End: 2021-09-09 | Stop reason: SDUPTHER

## 2021-09-09 DIAGNOSIS — F41.9 ANXIETY: ICD-10-CM

## 2021-09-09 RX ORDER — HYDROXYZINE HYDROCHLORIDE 25 MG/1
25 TABLET, FILM COATED ORAL EVERY 6 HOURS PRN
Qty: 30 TABLET | Refills: 0 | Status: SHIPPED | OUTPATIENT
Start: 2021-09-09 | End: 2021-09-26 | Stop reason: SDUPTHER

## 2021-09-09 RX ORDER — BUSPIRONE HYDROCHLORIDE 15 MG/1
15 TABLET ORAL 2 TIMES DAILY
Qty: 60 TABLET | Refills: 0 | Status: SHIPPED | OUTPATIENT
Start: 2021-09-09 | End: 2021-10-09 | Stop reason: SDUPTHER

## 2021-09-22 ENCOUNTER — IMMUNIZATION (OUTPATIENT)
Dept: OCCUPATIONAL MEDICINE | Facility: CLINIC | Age: 51
End: 2021-09-22
Payer: COMMERCIAL

## 2021-09-22 DIAGNOSIS — Z23 NEED FOR VACCINATION: Primary | ICD-10-CM

## 2021-09-28 PROCEDURE — 90686 IIV4 VACC NO PRSV 0.5 ML IM: CPT | Performed by: PREVENTIVE MEDICINE

## 2021-10-09 ENCOUNTER — TELEPHONE (OUTPATIENT)
Dept: MEDICAL GROUP | Facility: MEDICAL CENTER | Age: 51
End: 2021-10-09

## 2021-10-09 DIAGNOSIS — F41.9 ANXIETY: ICD-10-CM

## 2021-10-10 RX ORDER — BUSPIRONE HYDROCHLORIDE 15 MG/1
15 TABLET ORAL 2 TIMES DAILY
Qty: 30 TABLET | Refills: 0 | Status: SHIPPED | OUTPATIENT
Start: 2021-10-10 | End: 2021-10-27 | Stop reason: SDUPTHER

## 2021-10-15 DIAGNOSIS — F41.9 ANXIETY: ICD-10-CM

## 2021-10-15 RX ORDER — HYDROXYZINE HYDROCHLORIDE 25 MG/1
25 TABLET, FILM COATED ORAL EVERY 6 HOURS PRN
Qty: 30 TABLET | Refills: 0 | Status: SHIPPED | OUTPATIENT
Start: 2021-10-15 | End: 2021-11-08 | Stop reason: SDUPTHER

## 2021-10-27 DIAGNOSIS — F41.9 ANXIETY: ICD-10-CM

## 2021-10-27 RX ORDER — BUSPIRONE HYDROCHLORIDE 15 MG/1
15 TABLET ORAL 2 TIMES DAILY
Qty: 30 TABLET | Refills: 0 | Status: SHIPPED | OUTPATIENT
Start: 2021-10-27 | End: 2021-11-18 | Stop reason: SDUPTHER

## 2021-11-08 DIAGNOSIS — F41.9 ANXIETY: ICD-10-CM

## 2021-11-08 RX ORDER — HYDROXYZINE HYDROCHLORIDE 25 MG/1
25 TABLET, FILM COATED ORAL EVERY 6 HOURS PRN
Qty: 30 TABLET | Refills: 0 | Status: SHIPPED | OUTPATIENT
Start: 2021-11-08 | End: 2021-12-06 | Stop reason: SDUPTHER

## 2021-11-18 ENCOUNTER — OFFICE VISIT (OUTPATIENT)
Dept: MEDICAL GROUP | Facility: MEDICAL CENTER | Age: 51
End: 2021-11-18
Payer: COMMERCIAL

## 2021-11-18 VITALS
BODY MASS INDEX: 19.4 KG/M2 | SYSTOLIC BLOOD PRESSURE: 112 MMHG | WEIGHT: 123.6 LBS | DIASTOLIC BLOOD PRESSURE: 72 MMHG | HEIGHT: 67 IN | HEART RATE: 70 BPM | TEMPERATURE: 97.6 F | OXYGEN SATURATION: 99 %

## 2021-11-18 DIAGNOSIS — F33.1 MODERATE EPISODE OF RECURRENT MAJOR DEPRESSIVE DISORDER (HCC): ICD-10-CM

## 2021-11-18 DIAGNOSIS — R51.9 CHRONIC NONINTRACTABLE HEADACHE, UNSPECIFIED HEADACHE TYPE: ICD-10-CM

## 2021-11-18 DIAGNOSIS — G89.29 CHRONIC NONINTRACTABLE HEADACHE, UNSPECIFIED HEADACHE TYPE: ICD-10-CM

## 2021-11-18 DIAGNOSIS — R11.2 NAUSEA AND VOMITING, INTRACTABILITY OF VOMITING NOT SPECIFIED, UNSPECIFIED VOMITING TYPE: ICD-10-CM

## 2021-11-18 DIAGNOSIS — F41.9 ANXIETY: ICD-10-CM

## 2021-11-18 PROCEDURE — 99214 OFFICE O/P EST MOD 30 MIN: CPT | Performed by: NURSE PRACTITIONER

## 2021-11-18 RX ORDER — SERTRALINE HYDROCHLORIDE 100 MG/1
100 TABLET, FILM COATED ORAL DAILY
Qty: 30 TABLET | Refills: 1 | Status: SHIPPED | OUTPATIENT
Start: 2021-11-18 | End: 2021-12-20 | Stop reason: SDUPTHER

## 2021-11-18 RX ORDER — BUTALBITAL, ACETAMINOPHEN AND CAFFEINE 50; 325; 40 MG/1; MG/1; MG/1
1 TABLET ORAL EVERY 4 HOURS PRN
Qty: 30 TABLET | Refills: 0 | Status: SHIPPED | OUTPATIENT
Start: 2021-11-18 | End: 2022-02-22 | Stop reason: SDUPTHER

## 2021-11-18 RX ORDER — BUSPIRONE HYDROCHLORIDE 15 MG/1
15 TABLET ORAL 2 TIMES DAILY
Qty: 60 TABLET | Refills: 0 | Status: SHIPPED | OUTPATIENT
Start: 2021-11-18 | End: 2021-12-20 | Stop reason: SDUPTHER

## 2021-11-18 ASSESSMENT — FIBROSIS 4 INDEX: FIB4 SCORE: 0.91

## 2021-11-18 NOTE — NON-PROVIDER
Subjective     Ema Smith is a 51-year-old female who presents with anxiety.    HPI: Seen in f/u for anxiety. 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 She has not gotten COVID vaccine and she is worried about losing her job due to COVID vaccine mandate at her job. She reports having anxiety attack x1/week.   She is not sleeping well. She works night shift. She feels her symptoms are getting worse in the last several months, and feels her medications are not welling as well as it used to.  She reports N/V x4 days. She thinks it might be the viral going around at her work. Vomit x3/ day. She is trying to hydrate herself with plenty of fluids.       Patient Active Problem List   Diagnosis   • Bilateral breast cysts   • Genital herpes   • History of cervical fracture   • Chronic neck pain   • GERD (gastroesophageal reflux disease)   • Generalized anxiety disorder   • Recurrent major depressive disorder, in partial remission (HCC)     Current Outpatient Medications   Medication Sig Dispense Refill   • busPIRone (BUSPAR) 15 MG tablet Take 1 Tablet by mouth 2 times a day. 60 Tablet 0   • sertraline (ZOLOFT) 100 MG Tab Take 1 Tablet by mouth every day. 30 Tablet 1   • butalbital/apap/caffeine -40 mg (FIORICET) -40 MG Tab Take 1 Tablet by mouth every four hours as needed for Headache for up to 90 days. 30 Tablet 0   • hydrOXYzine HCl (ATARAX) 25 MG Tab Take 1 Tablet by mouth every 6 hours as needed for Itching. 30 Tablet 0   • sertraline (ZOLOFT) 50 MG Tab Take 1 Tablet by mouth every day. 90 Tablet 1   • diclofenac sodium (VOLTAREN) 1 % Gel Apply  topically.     • ibuprofen (MOTRIN) 200 MG Tab Take 800 mg by mouth every 6 hours as needed.     • cyclobenzaprine (FLEXERIL) 10 mg Tab Take 1 tablet by mouth 3 times a day as needed. 270 tablet 3     No current facility-administered medications for this visit.     Allergies:  Bee venom and  Pcn [penicillins]     Review of Systems   Constitutional: Negative for fever, chills, weight loss, diaphoresis.   Respiratory: Negative.  Negative for cough, hemoptysis, sputum production, shortness of breath, wheezing and stridor.    Cardiovascular: Negative.  Negative for chest pain, palpitations, orthopnea, claudication, leg swelling and PND.   Gastrointestinal: Reports nausea & vomiting. No diarrhea, constipation, heartburn, melena or hematochezia.   Musculoskeletal: Negative.  Negative for myalgias and back pain.   Neurological: Negative.  Negative for dizziness, tingling, tremors, weakness and headaches.   Psych:  Reports not feeling well lately. Anxiety attack x1/wk. Reports not sleeping well.  All other systems reviewed and are negative.    Objective   Physical Exam  Vitals reviewed.  Constitutional: oriented to person, place, and time. appears well-developed and well-nourished. No distress.   Cardiovascular: Normal rate, regular rhythm, normal heart sounds and intact distal pulses.  Exam reveals no gallop and no friction rub.  No murmur heard.  No carotid bruits.   Pulmonary/Chest: Effort normal and breath sounds normal. No stridor. No respiratory distress. no wheezes or rales. exhibits no tenderness.   Musculoskeletal: Normal range of motion. exhibits no edema. aden pedal pulses 2+.  Lymphadenopathy: no cervical or supraclavicular adenopathy.   Neurological: alert and oriented to person, place, and time. exhibits normal muscle tone. Coordination normal.   Skin: Skin is warm and dry. no diaphoresis.   Psychiatric: emotional and mood changes noted.    Assessment & Plan   1. Anxiety  busPIRone (BUSPAR) 15 MG tablet    Referral to Psychiatry    inc buspar to 15 mg bid.  strict ER precautions given if pain reoccurs.  f/u 2 wks for sx eval and test review.  RTW 2/16/2020 unless sx not controlled.    2. Anxiety  busPIRone (BUSPAR) 15 MG tablet    Referral to Psychiatry    Continue and refilled buspar. zoloft  increased to 100mg. refer to psychiatry. f/u in 1 month   3. Depression, unspecified depression type  sertraline (ZOLOFT) 100 MG Tab    Referral to Psychiatry    zoloft not working well. increased dosage to 100mg zoloft. refer to psychiatry. f/u in 1 month   4. Nausea and vomiting, intractability of vomiting not specified, unspecified vomiting type  butalbital/apap/caffeine -40 mg (FIORICET) -40 MG Tab    increase hydration. monitor and f/u in 1 month     Follow up:   check labs and f/u in 1 month.

## 2021-11-19 RX ORDER — ONDANSETRON 4 MG/1
4 TABLET, FILM COATED ORAL EVERY 4 HOURS PRN
Qty: 20 TABLET | Refills: 0 | Status: SHIPPED | OUTPATIENT
Start: 2021-11-19

## 2021-11-19 NOTE — PROGRESS NOTES
Subjective:     Ema Smith is a 51 y.o. female who presents with anxiety.    HPI:   Seen in f/u for anxiety.     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  She is also having nausea and feels that she is getting dehydrated.  She declines med for nausea.  She reports N/V x4 days. She thinks it might be the viral going around at her work. Vomit x3/ day. She is trying to hydrate herself with plenty of fluids.  She has not gotten COVID vaccine and she is worried about losing her job due to COVID vaccine mandate at her job. She reports having anxiety attack x1/week. She is not sleeping well. She works night shift. She feels her symptoms are getting worse in the last several months, and feels her medications are not welling as well as it used to.  She is tearful in office today.  She also feels that she has depression d/t the stress and anxiety.  She is not seeing psychiatry.  She needs refill on her fioricet d/t having headaches from her anxiety and stress.  She only uses occas.      Patient Active Problem List    Diagnosis Date Noted   • Generalized anxiety disorder 02/25/2021   • Recurrent major depressive disorder, in partial remission (HCC) 02/25/2021   • GERD (gastroesophageal reflux disease) 12/23/2019   • History of cervical fracture 11/05/2019   • Chronic neck pain 11/05/2019   • Bilateral breast cysts 09/06/2018   • Genital herpes 09/06/2018       Current medicines (including changes today)  Current Outpatient Medications   Medication Sig Dispense Refill   • busPIRone (BUSPAR) 15 MG tablet Take 1 Tablet by mouth 2 times a day. 60 Tablet 0   • sertraline (ZOLOFT) 100 MG Tab Take 1 Tablet by mouth every day. 30 Tablet 1   • butalbital/apap/caffeine -40 mg (FIORICET) -40 MG Tab Take 1 Tablet by mouth every four hours as needed for Headache for up to 90 days. 30 Tablet 0   • hydrOXYzine HCl (ATARAX) 25 MG Tab Take 1 Tablet by mouth every 6  "hours as needed for Itching. 30 Tablet 0   • sertraline (ZOLOFT) 50 MG Tab Take 1 Tablet by mouth every day. 90 Tablet 1   • diclofenac sodium (VOLTAREN) 1 % Gel Apply  topically.     • ibuprofen (MOTRIN) 200 MG Tab Take 800 mg by mouth every 6 hours as needed.     • cyclobenzaprine (FLEXERIL) 10 mg Tab Take 1 tablet by mouth 3 times a day as needed. 270 tablet 3     No current facility-administered medications for this visit.       Allergies   Allergen Reactions   • Bee Venom Anaphylaxis   • Pcn [Penicillins] Anaphylaxis       ROS  Constitutional: Negative. Negative for fever, chills, weight loss, malaise/fatigue and diaphoresis.   HENT: Negative. Negative for hearing loss, ear pain, nosebleeds, congestion, sore throat, neck pain, tinnitus and ear discharge.   Respiratory: Negative. Negative for cough, hemoptysis, sputum production, shortness of breath, wheezing and stridor.   Cardiovascular: Negative. Negative for chest pain, palpitations, orthopnea, claudication, leg swelling and PND.   Gastrointestinal: Denies diarrhea, constipation, heartburn, melena or hematochezia.  Genitourinary: Denies dysuria, hematuria, urinary incontinence, frequency or urgency.        Objective:     /72 (BP Location: Right arm, Patient Position: Sitting)   Pulse 70   Temp 36.4 °C (97.6 °F) (Temporal)   Ht 1.702 m (5' 7\")   Wt 56.1 kg (123 lb 9.6 oz)   SpO2 99%  Body mass index is 19.36 kg/m².    Physical Exam:  Vitals reviewed.  Constitutional: Oriented to person, place, and time. appears well-developed and well-nourished. No distress.   Cardiovascular: Normal rate, regular rhythm, normal heart sounds and intact distal pulses. Exam reveals no gallop and no friction rub. No murmur heard. No carotid bruits.   Pulmonary/Chest: Effort normal and breath sounds normal. No stridor. No respiratory distress. no wheezes or rales. exhibits no tenderness.   Musculoskeletal: Normal range of motion. exhibits no edema. aden pedal pulses " 2+.  Lymphadenopathy: No cervical or supraclavicular adenopathy.   Neurological: Alert and oriented to person, place, and time. exhibits normal muscle tone.  Skin: Skin is warm and dry. No diaphoresis.   Psychiatric: Normal mood and affect. Behavior is normal.      Assessment and Plan:     The following treatment plan was discussed:    1. Anxiety  busPIRone (BUSPAR) 15 MG tablet    sertraline (ZOLOFT) 100 MG Tab    Referral to Psychiatry    CANCELED: Referral to Psychiatry    refilled buspar.  refer to psychiatry d/t sx uncontrolled. increase zoloft to 100 mg daily.  f/u 1 mo for lab review and sx eval   2. Moderate episode of recurrent major depressive disorder (HCC)  Referral to Psychiatry    increase zoloft.  refer psych for med mgmt.  f/u 1 mo for sx eval, lab review.     3. Nausea and vomiting, intractability of vomiting not specified, unspecified vomiting type      increase hydration. do labs and f/u 1 month.  she agrees to see psychiatry.  will order asap.   4. Chronic nonintractable headache, unspecified headache type  butalbital/apap/caffeine -40 mg (FIORICET) -40 MG Tab    refilled fioricet         Followup: Return in about 4 weeks (around 12/16/2021).

## 2021-12-06 DIAGNOSIS — F41.9 ANXIETY: ICD-10-CM

## 2021-12-06 RX ORDER — HYDROXYZINE HYDROCHLORIDE 25 MG/1
25 TABLET, FILM COATED ORAL EVERY 6 HOURS PRN
Qty: 30 TABLET | Refills: 0 | Status: SHIPPED | OUTPATIENT
Start: 2021-12-06 | End: 2021-12-20 | Stop reason: SDUPTHER

## 2021-12-07 ENCOUNTER — HOSPITAL ENCOUNTER (OUTPATIENT)
Dept: LAB | Facility: MEDICAL CENTER | Age: 51
End: 2021-12-07
Attending: NURSE PRACTITIONER
Payer: COMMERCIAL

## 2021-12-07 DIAGNOSIS — Z13.89 SCREENING FOR MULTIPLE CONDITIONS: ICD-10-CM

## 2021-12-07 DIAGNOSIS — R03.0 ELEVATED BLOOD PRESSURE READING: ICD-10-CM

## 2021-12-07 DIAGNOSIS — Z13.0 SCREENING, ANEMIA, DEFICIENCY, IRON: ICD-10-CM

## 2021-12-07 DIAGNOSIS — Z13.220 SCREENING FOR HYPERLIPIDEMIA: ICD-10-CM

## 2021-12-07 DIAGNOSIS — Z13.21 ENCOUNTER FOR VITAMIN DEFICIENCY SCREENING: ICD-10-CM

## 2021-12-07 DIAGNOSIS — Z13.29 SCREENING FOR THYROID DISORDER: ICD-10-CM

## 2021-12-07 DIAGNOSIS — Z79.899 HIGH RISK MEDICATION USE: ICD-10-CM

## 2021-12-07 LAB
ALBUMIN SERPL BCP-MCNC: 4.8 G/DL (ref 3.2–4.9)
ALBUMIN/GLOB SERPL: 2.1 G/DL
ALP SERPL-CCNC: 95 U/L (ref 30–99)
ALT SERPL-CCNC: 21 U/L (ref 2–50)
ANION GAP SERPL CALC-SCNC: 11 MMOL/L (ref 7–16)
AST SERPL-CCNC: 27 U/L (ref 12–45)
BASOPHILS # BLD AUTO: 0.9 % (ref 0–1.8)
BASOPHILS # BLD: 0.04 K/UL (ref 0–0.12)
BILIRUB SERPL-MCNC: 0.3 MG/DL (ref 0.1–1.5)
BUN SERPL-MCNC: 21 MG/DL (ref 8–22)
CALCIUM SERPL-MCNC: 9.7 MG/DL (ref 8.5–10.5)
CHLORIDE SERPL-SCNC: 103 MMOL/L (ref 96–112)
CHOLEST SERPL-MCNC: 220 MG/DL (ref 100–199)
CO2 SERPL-SCNC: 26 MMOL/L (ref 20–33)
CREAT SERPL-MCNC: 0.77 MG/DL (ref 0.5–1.4)
CREAT UR-MCNC: 118 MG/DL
EOSINOPHIL # BLD AUTO: 0.13 K/UL (ref 0–0.51)
EOSINOPHIL NFR BLD: 2.9 % (ref 0–6.9)
ERYTHROCYTE [DISTWIDTH] IN BLOOD BY AUTOMATED COUNT: 39.8 FL (ref 35.9–50)
FASTING STATUS PATIENT QL REPORTED: NORMAL
GLOBULIN SER CALC-MCNC: 2.3 G/DL (ref 1.9–3.5)
GLUCOSE SERPL-MCNC: 92 MG/DL (ref 65–99)
HCT VFR BLD AUTO: 43.8 % (ref 37–47)
HDLC SERPL-MCNC: 69 MG/DL
HGB BLD-MCNC: 15.4 G/DL (ref 12–16)
IMM GRANULOCYTES # BLD AUTO: 0.02 K/UL (ref 0–0.11)
IMM GRANULOCYTES NFR BLD AUTO: 0.4 % (ref 0–0.9)
LDLC SERPL CALC-MCNC: 138 MG/DL
LYMPHOCYTES # BLD AUTO: 1.82 K/UL (ref 1–4.8)
LYMPHOCYTES NFR BLD: 40.3 % (ref 22–41)
MCH RBC QN AUTO: 30.6 PG (ref 27–33)
MCHC RBC AUTO-ENTMCNC: 35.2 G/DL (ref 33.6–35)
MCV RBC AUTO: 87.1 FL (ref 81.4–97.8)
MICROALBUMIN UR-MCNC: <1.2 MG/DL
MICROALBUMIN/CREAT UR: NORMAL MG/G (ref 0–30)
MONOCYTES # BLD AUTO: 0.37 K/UL (ref 0–0.85)
MONOCYTES NFR BLD AUTO: 8.2 % (ref 0–13.4)
NEUTROPHILS # BLD AUTO: 2.14 K/UL (ref 2–7.15)
NEUTROPHILS NFR BLD: 47.3 % (ref 44–72)
NRBC # BLD AUTO: 0 K/UL
NRBC BLD-RTO: 0 /100 WBC
PLATELET # BLD AUTO: 269 K/UL (ref 164–446)
PMV BLD AUTO: 9.4 FL (ref 9–12.9)
POTASSIUM SERPL-SCNC: 4.6 MMOL/L (ref 3.6–5.5)
PROT SERPL-MCNC: 7.1 G/DL (ref 6–8.2)
RBC # BLD AUTO: 5.03 M/UL (ref 4.2–5.4)
SODIUM SERPL-SCNC: 140 MMOL/L (ref 135–145)
TRIGL SERPL-MCNC: 66 MG/DL (ref 0–149)
TSH SERPL DL<=0.005 MIU/L-ACNC: 2.05 UIU/ML (ref 0.38–5.33)
WBC # BLD AUTO: 4.5 K/UL (ref 4.8–10.8)

## 2021-12-07 PROCEDURE — 80053 COMPREHEN METABOLIC PANEL: CPT

## 2021-12-07 PROCEDURE — 82570 ASSAY OF URINE CREATININE: CPT

## 2021-12-07 PROCEDURE — 82306 VITAMIN D 25 HYDROXY: CPT

## 2021-12-07 PROCEDURE — 80061 LIPID PANEL: CPT

## 2021-12-07 PROCEDURE — 36415 COLL VENOUS BLD VENIPUNCTURE: CPT

## 2021-12-07 PROCEDURE — 85025 COMPLETE CBC W/AUTO DIFF WBC: CPT

## 2021-12-07 PROCEDURE — 84443 ASSAY THYROID STIM HORMONE: CPT

## 2021-12-07 PROCEDURE — 82043 UR ALBUMIN QUANTITATIVE: CPT

## 2021-12-09 ENCOUNTER — TELEPHONE (OUTPATIENT)
Dept: MEDICAL GROUP | Facility: MEDICAL CENTER | Age: 51
End: 2021-12-09

## 2021-12-09 NOTE — LETTER
December 9, 2021        Ema Smith  [address] [place] [address]        Dear Ema:    Our records indicate that you are due for your next  in-clinic visit to review lab results. Please give us a call at [phone] and our scheduling team will assist you with scheduling this appointment.      If you have any questions or concerns, please don't hesitate to call.        Sincerely,        HARI Pat.    Electronically Signed

## 2021-12-09 NOTE — TELEPHONE ENCOUNTER
----- Message from VALERIY Pat sent at 12/7/2021  7:32 PM PST -----  Please have pt set appointment to review and discuss treatment for labs.

## 2021-12-10 LAB — 25(OH)D3 SERPL-MCNC: 27 NG/ML (ref 30–80)

## 2021-12-20 DIAGNOSIS — F41.9 ANXIETY: ICD-10-CM

## 2021-12-20 RX ORDER — HYDROXYZINE HYDROCHLORIDE 25 MG/1
25 TABLET, FILM COATED ORAL EVERY 6 HOURS PRN
Qty: 30 TABLET | Refills: 0 | Status: SHIPPED | OUTPATIENT
Start: 2021-12-20 | End: 2022-01-13 | Stop reason: SDUPTHER

## 2021-12-20 RX ORDER — BUSPIRONE HYDROCHLORIDE 15 MG/1
15 TABLET ORAL 2 TIMES DAILY
Qty: 60 TABLET | Refills: 0 | Status: SHIPPED | OUTPATIENT
Start: 2021-12-20 | End: 2022-01-19 | Stop reason: SDUPTHER

## 2021-12-20 RX ORDER — SERTRALINE HYDROCHLORIDE 100 MG/1
100 TABLET, FILM COATED ORAL DAILY
Qty: 30 TABLET | Refills: 1 | Status: SHIPPED | OUTPATIENT
Start: 2021-12-20 | End: 2022-02-13 | Stop reason: SDUPTHER

## 2022-01-19 DIAGNOSIS — F41.9 ANXIETY: ICD-10-CM

## 2022-01-19 RX ORDER — BUSPIRONE HYDROCHLORIDE 15 MG/1
15 TABLET ORAL 2 TIMES DAILY
Qty: 60 TABLET | Refills: 0 | Status: SHIPPED | OUTPATIENT
Start: 2022-01-19 | End: 2022-02-22 | Stop reason: SDUPTHER

## 2022-01-26 DIAGNOSIS — M54.2 CHRONIC NECK PAIN: ICD-10-CM

## 2022-01-26 DIAGNOSIS — G89.29 CHRONIC NECK PAIN: ICD-10-CM

## 2022-02-22 DIAGNOSIS — R51.9 CHRONIC NONINTRACTABLE HEADACHE, UNSPECIFIED HEADACHE TYPE: ICD-10-CM

## 2022-02-22 DIAGNOSIS — G89.29 CHRONIC NONINTRACTABLE HEADACHE, UNSPECIFIED HEADACHE TYPE: ICD-10-CM

## 2022-02-22 RX ORDER — BUTALBITAL, ACETAMINOPHEN AND CAFFEINE 50; 325; 40 MG/1; MG/1; MG/1
1 TABLET ORAL EVERY 4 HOURS PRN
Qty: 30 TABLET | Refills: 0 | Status: SHIPPED | OUTPATIENT
Start: 2022-02-22 | End: 2022-05-23

## 2022-03-13 DIAGNOSIS — E78.5 HYPERLIPIDEMIA LDL GOAL <130: ICD-10-CM

## 2022-09-13 ENCOUNTER — TELEPHONE (OUTPATIENT)
Dept: MEDICAL GROUP | Facility: MEDICAL CENTER | Age: 52
End: 2022-09-13
Payer: COMMERCIAL

## 2022-09-13 DIAGNOSIS — E78.5 HYPERLIPIDEMIA LDL GOAL <130: ICD-10-CM

## 2022-09-13 DIAGNOSIS — E55.9 VITAMIN D DEFICIENCY: ICD-10-CM

## 2022-09-13 DIAGNOSIS — D72.819 LEUKOPENIA, UNSPECIFIED TYPE: ICD-10-CM

## 2022-09-13 DIAGNOSIS — F41.9 ANXIETY: ICD-10-CM

## 2022-09-13 RX ORDER — SERTRALINE HYDROCHLORIDE 100 MG/1
100 TABLET, FILM COATED ORAL DAILY
Qty: 30 TABLET | Refills: 0 | Status: SHIPPED | OUTPATIENT
Start: 2022-09-13 | End: 2022-10-25 | Stop reason: SDUPTHER

## 2022-09-13 NOTE — LETTER
September 13, 2022        Ema Smith  [address] [address]        Dear Ema:    Our records indicate that you are due for your next  in-clinic visit. Please give us a call at [phone] and our scheduling team will assist you with scheduling this appointment. Be sure to complete your labs prior to this appointment!      If you have any questions or concerns, please don't hesitate to call.        Sincerely,        HARI Pat.    Electronically Signed

## 2022-09-23 ENCOUNTER — OFFICE VISIT (OUTPATIENT)
Dept: URGENT CARE | Facility: PHYSICIAN GROUP | Age: 52
End: 2022-09-23
Payer: COMMERCIAL

## 2022-09-23 ENCOUNTER — HOSPITAL ENCOUNTER (OUTPATIENT)
Dept: RADIOLOGY | Facility: MEDICAL CENTER | Age: 52
End: 2022-09-23
Attending: FAMILY MEDICINE
Payer: COMMERCIAL

## 2022-09-23 VITALS
HEART RATE: 84 BPM | RESPIRATION RATE: 18 BRPM | WEIGHT: 122 LBS | OXYGEN SATURATION: 97 % | DIASTOLIC BLOOD PRESSURE: 84 MMHG | SYSTOLIC BLOOD PRESSURE: 122 MMHG | TEMPERATURE: 97.4 F | HEIGHT: 67 IN | BODY MASS INDEX: 19.15 KG/M2

## 2022-09-23 DIAGNOSIS — M54.9 DORSALGIA: ICD-10-CM

## 2022-09-23 PROCEDURE — 72100 X-RAY EXAM L-S SPINE 2/3 VWS: CPT

## 2022-09-23 PROCEDURE — 99213 OFFICE O/P EST LOW 20 MIN: CPT | Performed by: FAMILY MEDICINE

## 2022-09-23 RX ORDER — MELOXICAM 7.5 MG/1
7.5 TABLET ORAL DAILY
Qty: 7 TABLET | Refills: 1 | Status: SHIPPED | OUTPATIENT
Start: 2022-09-23

## 2022-09-23 RX ORDER — DEXAMETHASONE SODIUM PHOSPHATE 4 MG/ML
8 INJECTION, SOLUTION INTRA-ARTICULAR; INTRALESIONAL; INTRAMUSCULAR; INTRAVENOUS; SOFT TISSUE ONCE
Status: COMPLETED | OUTPATIENT
Start: 2022-09-23 | End: 2022-09-23

## 2022-09-23 RX ADMIN — DEXAMETHASONE SODIUM PHOSPHATE 8 MG: 4 INJECTION, SOLUTION INTRA-ARTICULAR; INTRALESIONAL; INTRAMUSCULAR; INTRAVENOUS; SOFT TISSUE at 08:59

## 2022-09-23 ASSESSMENT — FIBROSIS 4 INDEX: FIB4 SCORE: 1.1

## 2022-09-23 ASSESSMENT — ENCOUNTER SYMPTOMS
MYALGIAS: 1
BACK PAIN: 1

## 2022-09-23 NOTE — LETTER
September 23, 2022         Patient: Ema Smith   YOB: 1970   Date of Visit: 9/23/2022           To Whom it May Concern:    Eam mSith was seen in my clinic on 9/23/2022. She may return to work in 5 days.    If you have any questions or concerns, please don't hesitate to call.        Sincerely,           Adam Barksdale M.D.  Electronically Signed

## 2022-09-23 NOTE — PROGRESS NOTES
Subjective     Ema Smith is a 52 y.o. female who presents with Back Pain (Lower back pain, twisted in bed wrong under a 16lb weighted blanket this morning, pt states sharp and intense px.)    - This is a pleasant and nontoxic appearing 52 y.o. female who has come to the walk-in clinic today for:    #1) this morning turned wrong in bed and felt sudden pain lower Lt back. Non radiating. No fever, no trauma, no bowel/bladder dysfunction or lower limb weakness/heaviness. Worse movement/position.       ALLERGIES:  Bee venom and Pcn [penicillins]     PMH:  Past Medical History:   Diagnosis Date    Bilateral breast cysts 9/6/2018    Encounter for long-term (current) use of other medications     Generalized anxiety disorder 2/25/2021    Genital herpes 9/6/2018    Neck pain 9/6/2018    Recurrent major depressive disorder, in partial remission (HCC) 2/25/2021        PSH:  Past Surgical History:   Procedure Laterality Date    OTHER      uterine septation        MEDS:    Current Outpatient Medications:     meloxicam (MOBIC) 7.5 MG Tab, Take 1 Tablet by mouth every day., Disp: 7 Tablet, Rfl: 1    sertraline (ZOLOFT) 100 MG Tab, Take 1 Tablet by mouth every day., Disp: 30 Tablet, Rfl: 0    cyclobenzaprine (FLEXERIL) 10 mg Tab, Take 1 Tablet by mouth 3 times a day as needed for Moderate Pain., Disp: 270 Tablet, Rfl: 0    hydrOXYzine HCl (ATARAX) 25 MG Tab, Take 1 Tablet by mouth every 6 hours as needed for Itching., Disp: 30 Tablet, Rfl: 0    busPIRone (BUSPAR) 15 MG tablet, Take 1 Tablet by mouth 2 times a day., Disp: 60 Tablet, Rfl: 0    ondansetron (ZOFRAN) 4 MG Tab tablet, Take 1 Tablet by mouth every four hours as needed for Nausea/Vomiting., Disp: 20 Tablet, Rfl: 0    diclofenac sodium (VOLTAREN) 1 % Gel, Apply  topically., Disp: , Rfl:     ibuprofen (MOTRIN) 200 MG Tab, Take 800 mg by mouth every 6 hours as needed., Disp: , Rfl:     ** I have documented what I find to be significant in regards to past medical, social,  "family and surgical history  in my HPI or under PMH/PSH/FH review section, otherwise it is noncontributory **           HPI    Review of Systems   Musculoskeletal:  Positive for back pain and myalgias.   All other systems reviewed and are negative.           Objective     /84 (BP Location: Left arm, Patient Position: Sitting, BP Cuff Size: Adult)   Pulse 84   Temp 36.3 °C (97.4 °F) (Temporal)   Resp 18   Ht 1.702 m (5' 7\")   Wt 55.3 kg (122 lb)   SpO2 97%   BMI 19.11 kg/m²      Physical Exam  Vitals and nursing note reviewed.   Constitutional:       General: She is not in acute distress.     Appearance: Normal appearance. She is well-developed.   HENT:      Head: Normocephalic.   Cardiovascular:      Heart sounds: Normal heart sounds. No murmur heard.  Pulmonary:      Effort: Pulmonary effort is normal. No respiratory distress.      Breath sounds: Normal breath sounds.   Musculoskeletal:        Legs:       Comments: No wounds  No gross deformity  No discoloration or rash  Bilateral lower extremity strength intact.  Can toe stand  Some pain to palp/rom   (Exam will be limited due to pain level)   Neurological:      Mental Status: She is alert.      Motor: No abnormal muscle tone.   Psychiatric:         Mood and Affect: Mood normal.         Behavior: Behavior normal.         Assessment & Plan     1. Dorsalgia  DX-LUMBAR SPINE-2 OR 3 VIEWS    meloxicam (MOBIC) 7.5 MG Tab    dexamethasone (DECADRON) injection 8 mg    Referral to Pain Clinic        Suspect SI sprain. Is seen by [facility], have asked to call and f/u w/ them today.     Xray: no acute findings by [facility]. RADIOLOGY READ PENDING.     - Dx, plan & d/c instructions discussed   - Rest, stay hydrated, OTC Tylenol as needed  - E.R. precautions discussed     Follow up with [facility] for a recheck on today's visit asap. ER if not improving in 2-3 days or if feeling/getting worse.     Any realistic side effects of medications that may have been given " today reviewed.     Patient left in stable condition     Today's radiology imaging personally reviewed by me today on day of visit and Radiology readings reviewed and discussed w/ patient today.

## 2022-10-25 DIAGNOSIS — F41.9 ANXIETY: ICD-10-CM

## 2022-10-25 RX ORDER — SERTRALINE HYDROCHLORIDE 100 MG/1
100 TABLET, FILM COATED ORAL DAILY
Qty: 30 TABLET | Refills: 0 | Status: SHIPPED | OUTPATIENT
Start: 2022-10-25

## 2022-11-07 ENCOUNTER — OFFICE VISIT (OUTPATIENT)
Dept: PHYSICAL MEDICINE AND REHAB | Facility: MEDICAL CENTER | Age: 52
End: 2022-11-07
Payer: COMMERCIAL

## 2022-11-07 VITALS
OXYGEN SATURATION: 96 % | HEART RATE: 82 BPM | WEIGHT: 126.1 LBS | HEIGHT: 67 IN | SYSTOLIC BLOOD PRESSURE: 126 MMHG | TEMPERATURE: 98 F | DIASTOLIC BLOOD PRESSURE: 84 MMHG | BODY MASS INDEX: 19.79 KG/M2

## 2022-11-07 DIAGNOSIS — M54.12 CERVICAL RADICULOPATHY: ICD-10-CM

## 2022-11-07 DIAGNOSIS — M54.81 BILATERAL OCCIPITAL NEURALGIA: ICD-10-CM

## 2022-11-07 DIAGNOSIS — M79.10 MYALGIA: ICD-10-CM

## 2022-11-07 PROCEDURE — 99204 OFFICE O/P NEW MOD 45 MIN: CPT | Performed by: STUDENT IN AN ORGANIZED HEALTH CARE EDUCATION/TRAINING PROGRAM

## 2022-11-07 ASSESSMENT — PAIN SCALES - GENERAL: PAINLEVEL: 6=MODERATE PAIN

## 2022-11-07 ASSESSMENT — FIBROSIS 4 INDEX: FIB4 SCORE: 1.1

## 2022-11-07 ASSESSMENT — PATIENT HEALTH QUESTIONNAIRE - PHQ9: CLINICAL INTERPRETATION OF PHQ2 SCORE: 0
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
## 2022-11-14 ENCOUNTER — OFFICE VISIT (OUTPATIENT)
Dept: PHYSICAL MEDICINE AND REHAB | Facility: MEDICAL CENTER | Age: 52
End: 2022-11-14
Payer: COMMERCIAL

## 2022-11-14 VITALS
HEIGHT: 67 IN | DIASTOLIC BLOOD PRESSURE: 76 MMHG | HEART RATE: 79 BPM | SYSTOLIC BLOOD PRESSURE: 116 MMHG | BODY MASS INDEX: 20 KG/M2 | OXYGEN SATURATION: 99 % | TEMPERATURE: 97.1 F | WEIGHT: 127.43 LBS

## 2022-11-14 DIAGNOSIS — M79.10 MYALGIA: Primary | ICD-10-CM

## 2022-11-14 DIAGNOSIS — M54.81 BILATERAL OCCIPITAL NEURALGIA: ICD-10-CM

## 2022-11-14 PROCEDURE — 76942 ECHO GUIDE FOR BIOPSY: CPT | Performed by: STUDENT IN AN ORGANIZED HEALTH CARE EDUCATION/TRAINING PROGRAM

## 2022-11-14 PROCEDURE — 20553 NJX 1/MLT TRIGGER POINTS 3/>: CPT | Performed by: STUDENT IN AN ORGANIZED HEALTH CARE EDUCATION/TRAINING PROGRAM

## 2022-11-14 ASSESSMENT — FIBROSIS 4 INDEX: FIB4 SCORE: 1.1

## 2022-11-14 ASSESSMENT — PAIN SCALES - GENERAL: PAINLEVEL: 6=MODERATE PAIN

## 2022-11-14 NOTE — PROCEDURES
Patient Name: Ema Smith  : 1970  Date of Service: 2022    Physician/s: Yenni Joshua MD    Pre-operative Diagnosis: Myalgia (M79.1)    Post-operative Diagnosis: Myalgia (M79.1)    Procedure: trigger point injections of the following muscles:    Site R L   Splenius capitis     Semispinalis capitis     Splenius cervicis     Sternocleidomastoid     Rhomboids     Levator scapulae  x   Pectoralis minor     Pectoralis major     Serratus anterior     Supraspinatus     Infraspinatus     Teres minor     Teres major     Quadratus lumborum     Paravertebral, cervical x    Paravertebral, thoracic     Paravertebral, lumbar     Gluteus david     Gluteus medius     Gluteus minimus     Tensor fascia mechelle     Vastus lateralis     Adductor dk     Adductor longus     Occipitalis     Cervical paraspinal     Trapezius, upper x x   Trapezius, mid     Trapezius, lower     Latissimus dorsi       Description of procedure:    The risks, benefits, and alternatives of the procedure were reviewed and discussed with the patient.  Written informed consent was freely obtained. A pre-procedural time-out was conducted by the physician verifying patient’s identity, procedure to be performed, procedure site and side, and allergy verification. Appropriate equipment was determined to be in place for the procedure.     In the office suite exam room the patient was placed in a seated position and the skin areas for injection over the above muscles were marked. A total of 6 areas of pain were identified for injection. The areas of pain were then prepped and draped in the usual sterile fashion. A solution was prepared with 10 mL of 1% lidocaine. Ultrasound was confirmed to view the adjacent structures for blood vessels and nerves and to confirm the needle path was not within the structures nor was it too deep to be within the lung. A 27g needle was placed into each of the markings at the areas above under ultrasound guidance with an  out of plane approach.. After negative aspiration, approximately 1-1.5 mL of the above solution was injected. The needle was removed intact after each trigger point injection, and the patient's back was covered with a 4x4 gauze, the area was cleansed with sterile normal saline, and a dressing was applied. There were no complications noted. The images were uploaded to our media tab for permanent storage.    Yenni Joshua MD  Interventional Pain and Spine  Physical Medicine and Rehabilitation  [facility]

## 2022-11-28 ENCOUNTER — OFFICE VISIT (OUTPATIENT)
Dept: PHYSICAL MEDICINE AND REHAB | Facility: MEDICAL CENTER | Age: 52
End: 2022-11-28
Payer: COMMERCIAL

## 2022-11-28 VITALS
SYSTOLIC BLOOD PRESSURE: 108 MMHG | DIASTOLIC BLOOD PRESSURE: 70 MMHG | BODY MASS INDEX: 20.52 KG/M2 | HEART RATE: 85 BPM | OXYGEN SATURATION: 97 % | TEMPERATURE: 97.5 F | HEIGHT: 67 IN | WEIGHT: 130.73 LBS

## 2022-11-28 DIAGNOSIS — M54.81 BILATERAL OCCIPITAL NEURALGIA: ICD-10-CM

## 2022-11-28 DIAGNOSIS — M54.12 CERVICAL RADICULOPATHY: ICD-10-CM

## 2022-11-28 DIAGNOSIS — M79.10 MYALGIA: ICD-10-CM

## 2022-11-28 PROCEDURE — 99213 OFFICE O/P EST LOW 20 MIN: CPT | Performed by: STUDENT IN AN ORGANIZED HEALTH CARE EDUCATION/TRAINING PROGRAM

## 2022-11-28 ASSESSMENT — PAIN SCALES - GENERAL: PAINLEVEL: 6=MODERATE PAIN

## 2022-11-28 ASSESSMENT — FIBROSIS 4 INDEX: FIB4 SCORE: 1.1

## 2022-11-28 ASSESSMENT — PATIENT HEALTH QUESTIONNAIRE - PHQ9: CLINICAL INTERPRETATION OF PHQ2 SCORE: 0

## 2022-11-28 NOTE — PROGRESS NOTES
Follow-up patient Note    Interventional Pain and Spine  Physiatry (Physical Medicine and Rehabilitation)     Patient Name: Ema Smith  : 1970  Date of service: 2022    Chief Complaint:   Chief Complaint   Patient presents with    Follow-Up     Myalgia       HISTORY  Please see new patient note by Dr. Joshua for more details.     HPI  Today's visit   Ema Smith ( 1970) is a female with Diagnoses of Myalgia, Cervical radiculopathy, and Bilateral occipital neuralgia were pertinent to this visit.    Ongoing pain at bilateral paracervical muscles, worst on the left side. Also with pain radiating over her occiput bilaterally.  Was hoping to get bilateral occipital nerve blocks to schedule today but unfortunately it has not been authorized by insurance.  She notes pain has slightly worsened after trigger point injections which she thinks was her pattern with trigger point injections previously.  She remembers having trigger point injections with steroid once per month which ultimately helped and would like to proceed with this if it is safe.  She continues to take Flexeril with some improvement.    Pain severity 6/10 currently  Pt denies new numbness, tingling, or weakness.    Procedure history:  - 22 trigger point injections     ROS:   Red Flags ROS:   Fever, Chills, Sweats: Denies  Involuntary Weight Loss: Denies  Bladder Incontinence: Denies  Bowel Incontinence: denies  Saddle Anesthesia: Denies    All other systems reviewed and negative.     PMHx:   Past Medical History:   Diagnosis Date    Bilateral breast cysts 2018    Encounter for long-term (current) use of other medications     Generalized anxiety disorder 2021    Genital herpes 2018    Neck pain 2018    Recurrent major depressive disorder, in partial remission (HCC) 2021       PSHx:   Past Surgical History:   Procedure Laterality Date    OTHER      uterine septation        Family Hx:   Family History   Problem  Relation Age of Onset    Alcohol abuse Maternal Grandfather     Cancer Paternal Grandmother         lung in smoker       Social Hx:  Social History     Socioeconomic History    Marital status: Single     Spouse name: Not on file    Number of children: Not on file    Years of education: Not on file    Highest education level: Not on file   Occupational History    Not on file   Tobacco Use    Smoking status: Former     Types: Cigarettes     Quit date:      Years since quittin.9    Smokeless tobacco: Never   Vaping Use    Vaping Use: Never used   Substance and Sexual Activity    Alcohol use: Not Currently     Alcohol/week: 1.2 oz     Types: 2 Shots of liquor per week     Comment: ocasionally     Drug use: No    Sexual activity: Not on file   Other Topics Concern    Not on file   Social History Narrative    Floor RN at [employer]     Financial Resource Strain: Not on file   Food Insecurity: Not on file   Transportation Needs: Not on file   Physical Activity: Not on file   Stress: Not on file   Social Connections: Not on file   Intimate Partner Violence: Not on file   Housing Stability: Not on file       Allergies:  Allergies   Allergen Reactions    Bee Venom Anaphylaxis    Pcn [Penicillins] Anaphylaxis       Medications: reviewed on epic.   Outpatient Medications Marked as Taking for the 22 encounter (Office Visit) with Yenni Joshua M.D.   Medication Sig Dispense Refill    hydrOXYzine HCl (ATARAX) 25 MG Tab Take 1 Tablet by mouth every 6 hours as needed for Itching. 15 Tablet 0    sertraline (ZOLOFT) 100 MG Tab Take 1 Tablet by mouth every day. 30 Tablet 0    cyclobenzaprine (FLEXERIL) 10 mg Tab Take 1 Tablet by mouth 3 times a day as needed for Moderate Pain. 90 Tablet 0    busPIRone (BUSPAR) 15 MG tablet Take 1 Tablet by mouth 2 times a day. 60 Tablet 0        Current Outpatient Medications on File Prior to Visit   Medication Sig Dispense Refill    hydrOXYzine  "HCl (ATARAX) 25 MG Tab Take 1 Tablet by mouth every 6 hours as needed for Itching. 15 Tablet 0    sertraline (ZOLOFT) 100 MG Tab Take 1 Tablet by mouth every day. 30 Tablet 0    cyclobenzaprine (FLEXERIL) 10 mg Tab Take 1 Tablet by mouth 3 times a day as needed for Moderate Pain. 90 Tablet 0    busPIRone (BUSPAR) 15 MG tablet Take 1 Tablet by mouth 2 times a day. 60 Tablet 0    meloxicam (MOBIC) 7.5 MG Tab Take 1 Tablet by mouth every day. 7 Tablet 1    ondansetron (ZOFRAN) 4 MG Tab tablet Take 1 Tablet by mouth every four hours as needed for Nausea/Vomiting. 20 Tablet 0    diclofenac sodium (VOLTAREN) 1 % Gel Apply  topically.      ibuprofen (MOTRIN) 200 MG Tab Take 4 Tablets by mouth every 6 hours as needed.       No current facility-administered medications on file prior to visit.         EXAMINATION     Physical Exam:   /70 (BP Location: Right arm, Patient Position: Sitting, BP Cuff Size: Adult)   Pulse 85   Temp 36.4 °C (97.5 °F) (Temporal)   Ht 1.702 m (5' 7\")   Wt 59.3 kg (130 lb 11.7 oz)   SpO2 97%     Constitutional:   Body Habitus: Body mass index is 20.48 kg/m².  Cooperation: Fully cooperates with exam  Appearance: Well-groomed, well-nourished.    Eyes: No scleral icterus to suggest severe liver disease, no proptosis to suggest severe hyperthyroidism    ENT -no obvious auditory deficits, no noticeable facial droop     Skin -no rashes or lesions noted     Respiratory-  breathing comfortably on room air, no audible wheezing    Cardiovascular-distal extremities warm and well perfused.  No lower extremity edema is noted.     Gastrointestinal - no obvious abdominal masses, non-distended    Psychiatric- alert and oriented ×3. Normal affect.     Musculoskeletal  positive Tinel's at right greater and lesser occipital nerves and tenderness to palpation at bilateral greater and lesser occipital nerves.    Tenderness to palpation at bilateral paracervical muscles, worse on left    Strength 5/5 and " sensation intact to light touch in bilateral upper extremities.      MEDICAL DECISION MAKING    Medical records review: see under HPI section.     DATA    Labs: No new labs available for review since last visit.      Lab Results   Component Value Date/Time    SODIUM 140 12/07/2021 10:09 AM    POTASSIUM 4.6 12/07/2021 10:09 AM    CHLORIDE 103 12/07/2021 10:09 AM    CO2 26 12/07/2021 10:09 AM    ANION 11.0 12/07/2021 10:09 AM    GLUCOSE 92 12/07/2021 10:09 AM    BUN 21 12/07/2021 10:09 AM    CREATININE 0.77 12/07/2021 10:09 AM    CALCIUM 9.7 12/07/2021 10:09 AM    ASTSGOT 27 12/07/2021 10:09 AM    ALTSGPT 21 12/07/2021 10:09 AM    TBILIRUBIN 0.3 12/07/2021 10:09 AM    ALBUMIN 4.8 12/07/2021 10:09 AM    TOTPROTEIN 7.1 12/07/2021 10:09 AM    GLOBULIN 2.3 12/07/2021 10:09 AM    AGRATIO 2.1 12/07/2021 10:09 AM       No results found for: PROTHROMBTM, INR     Lab Results   Component Value Date/Time    WBC 4.5 (L) 12/07/2021 10:09 AM    RBC 5.03 12/07/2021 10:09 AM    HEMOGLOBIN 15.4 12/07/2021 10:09 AM    HEMATOCRIT 43.8 12/07/2021 10:09 AM    MCV 87.1 12/07/2021 10:09 AM    MCH 30.6 12/07/2021 10:09 AM    MCHC 35.2 (H) 12/07/2021 10:09 AM    MPV 9.4 12/07/2021 10:09 AM    NEUTSPOLYS 47.30 12/07/2021 10:09 AM    LYMPHOCYTES 40.30 12/07/2021 10:09 AM    MONOCYTES 8.20 12/07/2021 10:09 AM    EOSINOPHILS 2.90 12/07/2021 10:09 AM    BASOPHILS 0.90 12/07/2021 10:09 AM        No results found for: HBA1C     Imaging:   I personally reviewed following images, these are my reads  MRI cervical spine 11/14/2018  Chronic fracture of C2.  Disc bulge worst at C5-6.  Moderate right neuroforaminal stenosis at C3-4. See formal radiology report for further details.     X-ray lumbar spine 9/23/2022  Mild facet arthropathy at L5-S1.  Alignment intact.  No significant disc space narrowing at any level. See formal radiology report for further details.     IMAGING radiology reads. I reviewed the following radiology reads                  Results  for orders placed during the hospital encounter of 18     MR-CERVICAL SPINE-W/O     Impression  1. Chronic ununited fracture of C2 dens with no malalignment or bone marrow edema.     2. Moderate right neuroforaminal narrowing at C3-4 due to uncovertebral hypertrophy.     3. Posterior central disc protrusion of C5-6 with mild indentation of the anterior spinal cord. No spinal canal narrowing. No cord signal change.                                                  Results for orders placed during the hospital encounter of 21     DX-CERVICAL SPINE-2 OR 3 VIEWS     Impression  1.  No compression deformity or acute fracture.     2.  Findings are consistent with mild degenerative disc disease and facet arthropathy.                           Results for orders placed during the hospital encounter of 22     DX-LUMBAR SPINE-2 OR 3 VIEWS     Impression  1.  No fracture or subluxation of lumbar spine.  2.  No significant degenerative changes.  3.  Minimal levoconvex curvature.    Diagnosis  Visit Diagnoses     ICD-10-CM   1. Myalgia  M79.10   2. Cervical radiculopathy  M54.12   3. Bilateral occipital neuralgia  M54.81         ASSESSMENT AND PLAN:  Ema Smith (: 1970) is a female with history of C2 fracture with severe bilateral cervical pain and upper trapezius pain that appears myalgia and myofascial in nature.  Previously improved with trigger point injections with [facility], which patient states she had 3 rounds of trigger point injections 1 month apart and including steroids each time.     Discussed my impression includes bilateral greater and lesser occipital neuralgia at this time given positive Tinel's at right greater and lesser occipital nerves and tenderness to palpation at bilateral greater and lesser occipital nerves.  She has radiating pain of her occiput and burning pain at the top of her head in the distribution of the bilateral greater and lesser occipital nerves.     She was  referred for evaluation and management of low back pain after a twisting injury in September 2022 and states that that pain has significantly improved.     Ema was seen today for follow-up.    Diagnoses and all orders for this visit:    Myalgia    Cervical radiculopathy    Bilateral occipital neuralgia        PLAN  Diagnostic workup: no new imaging needed at this time    Medications:   -Continue Flexeril which is helping with the patient's pain  -I have discussed that at this time my office is not accepting referrals for chronic narcotic management.  She has switched her PCP to one who aligns more with her needs    Interventions:   -Plan for steroid injection in 2-4 weeks.  If authorized, we will plan on bilateral greater and lesser occipital nerve blocks with steroid.  If this cannot be approved by insurance, we will plan on trigger point injections with steroid instead. The risks, benefits, and alternatives to this procedure were discussed and the patient wishes to proceed with the procedure. Risks include but are not limited to damage to surrounding structures, infection, bleeding, worsening of pain which can be permanent, and collapsed lung. Benefits include pain relief and improved function. Alternatives include not doing the procedure.  -Discussed that we should minimize risk of chronic steroids and try to limit steroid injections to once every 3 months if possible with exception of if she is having severe pain in which case we will assess risks and benefits of doing steroid injections more frequently.  She notes previous history of relief with a trigger point injection including steroid once per month with a different provider     Outside records requested:  The patient signed outside records request form for her outside records including outside images. This includes the records from [facility]     Follow-up: 2-4 weeks for injections    No orders of the defined types were placed in this  encounter.      Yenni Joshua MD  Interventional Pain and Spine  Physical Medicine and Rehabilitation  [facility]      The above note documents my personal evaluation of this patient. In addition, I have reviewed and confirmed with the patient and MA the supportive information documented in today's Patient Health Questionnaire and Office Note.     Please note that this dictation was created using voice recognition software. I have made every reasonable attempt to correct obvious errors, but I expect that there are errors of grammar and possibly content that I did not discover before finalizing the note.

## 2022-12-12 ENCOUNTER — OFFICE VISIT (OUTPATIENT)
Dept: PHYSICAL MEDICINE AND REHAB | Facility: MEDICAL CENTER | Age: 52
End: 2022-12-12
Payer: COMMERCIAL

## 2022-12-12 VITALS
WEIGHT: 128.31 LBS | HEART RATE: 109 BPM | DIASTOLIC BLOOD PRESSURE: 70 MMHG | OXYGEN SATURATION: 98 % | HEIGHT: 67 IN | TEMPERATURE: 97 F | BODY MASS INDEX: 20.14 KG/M2 | SYSTOLIC BLOOD PRESSURE: 112 MMHG

## 2022-12-12 DIAGNOSIS — M79.10 MYALGIA: Primary | ICD-10-CM

## 2022-12-12 PROCEDURE — 20553 NJX 1/MLT TRIGGER POINTS 3/>: CPT | Performed by: STUDENT IN AN ORGANIZED HEALTH CARE EDUCATION/TRAINING PROGRAM

## 2022-12-12 PROCEDURE — 76942 ECHO GUIDE FOR BIOPSY: CPT | Performed by: STUDENT IN AN ORGANIZED HEALTH CARE EDUCATION/TRAINING PROGRAM

## 2022-12-12 RX ORDER — DEXAMETHASONE SODIUM PHOSPHATE 4 MG/ML
4 INJECTION, SOLUTION INTRA-ARTICULAR; INTRALESIONAL; INTRAMUSCULAR; INTRAVENOUS; SOFT TISSUE ONCE
Status: COMPLETED | OUTPATIENT
Start: 2022-12-12 | End: 2022-12-12

## 2022-12-12 RX ADMIN — DEXAMETHASONE SODIUM PHOSPHATE 4 MG: 4 INJECTION, SOLUTION INTRA-ARTICULAR; INTRALESIONAL; INTRAMUSCULAR; INTRAVENOUS; SOFT TISSUE at 16:04

## 2022-12-12 ASSESSMENT — PATIENT HEALTH QUESTIONNAIRE - PHQ9: CLINICAL INTERPRETATION OF PHQ2 SCORE: 0

## 2022-12-12 ASSESSMENT — FIBROSIS 4 INDEX: FIB4 SCORE: 1.1

## 2022-12-12 ASSESSMENT — PAIN SCALES - GENERAL: PAINLEVEL: 7=MODERATE-SEVERE PAIN

## 2022-12-13 NOTE — PROCEDURES
Patient Name: Ema Smith  : 1970  Date of Service: 2022    Physician/s: Yenni Joshua MD    Pre-operative Diagnosis: Myalgia (M79.1)    Post-operative Diagnosis: Myalgia (M79.1)    Procedure: trigger point injections of the following muscles:    Site R L   Splenius capitis x x   Semispinalis capitis     Splenius cervicis     Sternocleidomastoid     Rhomboids     Levator scapulae x x   Pectoralis minor     Pectoralis major     Serratus anterior     Supraspinatus     Infraspinatus     Teres minor     Teres major     Quadratus lumborum     Paravertebral, cervical     Paravertebral, thoracic     Paravertebral, lumbar     Gluteus david     Gluteus medius     Gluteus minimus     Tensor fascia mechelle     Vastus lateralis     Adductor dk     Adductor longus     Occipitalis     Cervical paraspinal     Trapezius, upper x x   Trapezius, mid     Trapezius, lower     Latissimus dorsi       Description of procedure:    The risks, benefits, and alternatives of the procedure were reviewed and discussed with the patient.  Written informed consent was freely obtained. A pre-procedural time-out was conducted by the physician verifying patient’s identity, procedure to be performed, procedure site and side, and allergy verification. Appropriate equipment was determined to be in place for the procedure.     In the office suite exam room the patient was placed in a seated position and the skin areas for injection over the above muscles were marked. A total of 7 areas of pain were identified for injection. The areas of pain were then prepped and draped in the usual sterile fashion. A solution was prepared with 1 cc of 4 mg/mL of dexamethasone and 9 mL of 1% lidocaine. Ultrasound was confirmed to view the adjacent structures for blood vessels and nerves and to confirm the needle path was not within the structures nor was it too deep to be within the lung. A 27g needle was placed into each of the markings at the areas  above under ultrasound guidance with an out of plane approach.. After negative aspiration, approximately 1-1.5 mL of the above solution was injected. The needle was removed intact after each trigger point injection, and the patient's back was covered with a 4x4 gauze, the area was cleansed with sterile normal saline, and a dressing was applied. There were no complications noted. The images were uploaded to our media tab for permanent storage.    Yenni Joshua MD  Interventional Pain and Spine  Physical Medicine and Rehabilitation  [facility]